# Patient Record
Sex: MALE | Race: WHITE | NOT HISPANIC OR LATINO | Employment: OTHER | URBAN - METROPOLITAN AREA
[De-identification: names, ages, dates, MRNs, and addresses within clinical notes are randomized per-mention and may not be internally consistent; named-entity substitution may affect disease eponyms.]

---

## 2017-11-29 ENCOUNTER — OFFICE VISIT (OUTPATIENT)
Dept: ORTHOPEDIC SURGERY | Facility: CLINIC | Age: 67
End: 2017-11-29

## 2017-11-29 VITALS
BODY MASS INDEX: 30.16 KG/M2 | SYSTOLIC BLOOD PRESSURE: 165 MMHG | HEIGHT: 74 IN | HEART RATE: 81 BPM | DIASTOLIC BLOOD PRESSURE: 96 MMHG | WEIGHT: 235 LBS

## 2017-11-29 DIAGNOSIS — S83.411A SPRAIN OF MEDIAL COLLATERAL LIGAMENT OF RIGHT KNEE, INITIAL ENCOUNTER: ICD-10-CM

## 2017-11-29 DIAGNOSIS — M17.11 PRIMARY OSTEOARTHRITIS OF RIGHT KNEE: Primary | ICD-10-CM

## 2017-11-29 PROCEDURE — 99203 OFFICE O/P NEW LOW 30 MIN: CPT | Performed by: ORTHOPAEDIC SURGERY

## 2017-11-29 NOTE — PROGRESS NOTES
[facility] Orthopaedic Surgery Clinic Note    Subjective     Chief Complaint   Patient presents with   • Right Knee - Pain        HPI    Darryl Mendoza is a 67 y.o. male. He presents today for evaluation of his right knee.  Pain is mild to moderate in the knee, occurring over the past 4 weeks, following an injury when he fell at home and twisted his knee.  The pain has slightly improved, and he is fully ambulatory without external aids.  The pain is aching and burning, associated with popping and stiffness.  It worsens with walking and standing.      Patient Active Problem List   Diagnosis   • Sprain of medial collateral ligament of right knee   • Primary osteoarthritis of right knee     Past Medical History:   Diagnosis Date   • Dupuytren's contracture    • Hypertension    • Pain, ankle    • Smoker       Past Surgical History:   Procedure Laterality Date   • CERVICAL DISC SURGERY     • CERVICAL FUSION     • TONSILLECTOMY        Family History   Problem Relation Age of Onset   • Stroke Mother    • Hypertension Mother    • Osteoarthritis Mother    • Cancer Father    • Stroke Other    • Heart disease Other    • Hypertension Other    • Heart attack Other    • Osteoarthritis Other      Social History     Social History   • Marital status: Unknown     Spouse name: N/A   • Number of children: N/A   • Years of education: N/A     Occupational History   • Not on file.     Social History Main Topics   • Smoking status: Current Every Day Smoker   • Smokeless tobacco: Never Used   • Alcohol use Yes      Comment: occasional   • Drug use: No   • Sexual activity: Defer     Other Topics Concern   • Not on file     Social History Narrative      Current Outpatient Prescriptions on File Prior to Visit   Medication Sig Dispense Refill   • acetaminophen (TYLENOL) 500 MG tablet Take 500 mg by mouth Take As Directed.     • Amlodipine-Valsartan-HCTZ (EXFORGE HCT) 5-160-12.5 MG tablet Take  by mouth Take As Directed.     • atorvastatin  "(LIPITOR) 20 MG tablet Take  by mouth Take As Directed.     • gabapentin (NEURONTIN) 400 MG capsule Take  by mouth Take As Directed.     • HYDROcodone-acetaminophen (NORCO) 5-325 MG per tablet Take  by mouth Take As Directed.     • raNITIdine (ZANTAC) 300 MG tablet Take  by mouth Take As Directed.       No current facility-administered medications on file prior to visit.       No Known Allergies     Review of Systems   Constitutional: Negative.    HENT: Negative.    Eyes: Negative.    Respiratory: Negative.    Cardiovascular: Negative.    Gastrointestinal: Negative.    Endocrine: Negative.    Genitourinary: Negative.    Musculoskeletal: Positive for arthralgias and neck stiffness.   Skin: Negative.    Allergic/Immunologic: Negative.    Neurological: Negative.    Hematological: Negative.    Psychiatric/Behavioral: Negative.         Objective      Physical Exam  /96  Pulse 81  Ht 73.62\" (187 cm)  Wt 235 lb (107 kg)  BMI 30.48 kg/m2    Body mass index is 30.48 kg/(m^2).    General:   Mental Status:  Alert   Appearance: Cooperative, in no acute distress   Build and Nutrition: Well-nourished and well developed male   Orientation: Alert and oriented to person, place and time   Posture: Normal   Gait: Normal    Integument:   Right knee: No skin lesions, no rash, no ecchymosis    Neurologic:   Sensation:    Right foot: Intact to light touch on the dorsal and plantar aspect   Motor:  Right lower extremity: 5/5 quadriceps, hamstrings, ankle dorsiflexors, and ankle plantar flexors    Vascular:   Right lower extremity: 2+ dorsalis pedis pulse, prompt capillary refill    Lower Extremities:   Right Knee:    Tenderness:  Mild tenderness over the medial collateral ligament and medial joint line    Effusion:  None    Swelling:  None    Crepitus:  Positive    Atrophy:  None    Range of motion:  Extension: 0°       Flexion: 130°  Instability:  No varus laxity, no valgus laxity, negative anterior drawer, straight leg raise is " intact  Deformities:  Palpable Baker cyst      Imaging/Studies  Imaging Results (last 24 hours)     Procedure Component Value Units Date/Time    XR Knee 4+ View Right [[id number]] Resulted:  11/29/17 1000     Updated:  11/29/17 1000    Narrative:       RIght Knee Radiographs  Indication: right knee pain  Views: Standing AP's and skiers of both knees, with lateral and sunrise   views of the right knee    Comparison: no prior studies available    Findings:   Arthritic changes are seen in the knee, with medial joint space narrowing,   patellofemoral arthritic changes, with patella joy.            Assessment and Plan     Darryl was seen today for pain.    Diagnoses and all orders for this visit:    Primary osteoarthritis of right knee  -     XR Knee 4+ View Right    Sprain of medial collateral ligament of right knee, initial encounter        I reviewed my findings with patient today.  I suspect that he sprained his medial collateral ligament, and he does have some underlying arthritis in the knee.  He was mainly concerned if he has some structural damage to the knee.  The pain he has at current time is mild, and is certainly tolerable.  At this point, we will continue with conservative treatment, and I will see him back if there is any worsening or problems in the future.  He is pleased to know that he has no major structural problem with the knee.      Return if symptoms worsen or fail to improve.      Medical Decision Making    Data/Risk: radiology tests and independent visualization of imaging, lab tests, or EMG/NCV      Prashanth Avelar MD  11/29/17  7:31 PM

## 2018-04-11 ENCOUNTER — OFFICE VISIT (OUTPATIENT)
Dept: ORTHOPEDIC SURGERY | Facility: CLINIC | Age: 68
End: 2018-04-11

## 2018-04-11 VITALS
SYSTOLIC BLOOD PRESSURE: 156 MMHG | HEART RATE: 86 BPM | HEIGHT: 74 IN | WEIGHT: 236.99 LBS | DIASTOLIC BLOOD PRESSURE: 90 MMHG | BODY MASS INDEX: 30.42 KG/M2

## 2018-04-11 DIAGNOSIS — M17.11 PRIMARY OSTEOARTHRITIS OF RIGHT KNEE: Primary | ICD-10-CM

## 2018-04-11 PROCEDURE — 99213 OFFICE O/P EST LOW 20 MIN: CPT | Performed by: ORTHOPAEDIC SURGERY

## 2018-04-11 PROCEDURE — 20610 DRAIN/INJ JOINT/BURSA W/O US: CPT | Performed by: ORTHOPAEDIC SURGERY

## 2018-04-11 RX ORDER — TRIAMCINOLONE ACETONIDE 40 MG/ML
40 INJECTION, SUSPENSION INTRA-ARTICULAR; INTRAMUSCULAR
Status: COMPLETED | OUTPATIENT
Start: 2018-04-11 | End: 2018-04-11

## 2018-04-11 RX ORDER — HYDROCODONE BITARTRATE AND ACETAMINOPHEN 5; 325 MG/1; MG/1
1 TABLET ORAL
COMMUNITY
Start: 2018-04-09 | End: 2018-07-08

## 2018-04-11 RX ADMIN — TRIAMCINOLONE ACETONIDE 40 MG: 40 INJECTION, SUSPENSION INTRA-ARTICULAR; INTRAMUSCULAR at 09:27

## 2018-04-11 NOTE — PROGRESS NOTES
Procedure   Large Joint Arthrocentesis  Date/Time: 4/11/2018 9:27 AM  Consent given by: patient  Site marked: site marked  Timeout: Immediately prior to procedure a time out was called to verify the correct patient, procedure, equipment, support staff and site/side marked as required   Supporting Documentation  Indications: pain   Procedure Details  Location: knee - R knee  Preparation: Patient was prepped and draped in the usual sterile fashion  Needle size: 22 G  Approach: anterolateral  Medications administered: 40 mg triamcinolone acetonide 40 MG/ML (Naropin NDC: [id number] LOT: [id number] EXP: [id number])  Aspirate amount: 23 mL  Aspirate: bloody  Patient tolerance: patient tolerated the procedure well with no immediate complications

## 2018-04-11 NOTE — PROGRESS NOTES
[facility] [place] Orthopaedic Surgery Clinic Note    Subjective     Chief Complaint   Patient presents with   • Right Knee - Follow-up     4-5 month follow up        HPI    Darryl Mendoza is a 67 y.o. male. He presents today for admission right knee pain.  Pain is been present for about 6 months, following an injury when he slipped his bathroom at home.  The pain is moderate to severe, aching and stabbing, associated with stiffness.  It worsens with walking, standing and climbing stairs.  It is associated with swelling also.  He also notices a lump in the posterior medial aspect of the knee.      Patient Active Problem List   Diagnosis   • Sprain of medial collateral ligament of right knee   • Primary osteoarthritis of right knee     Past Medical History:   Diagnosis Date   • Dupuytren's contracture    • Hypertension    • Pain, ankle    • Smoker       Past Surgical History:   Procedure Laterality Date   • CERVICAL DISC SURGERY     • CERVICAL FUSION     • TONSILLECTOMY        Family History   Problem Relation Age of Onset   • Stroke Mother    • Hypertension Mother    • Osteoarthritis Mother    • Cancer Father    • Stroke Other    • Heart disease Other    • Hypertension Other    • Heart attack Other    • Osteoarthritis Other      Social History     Social History   • Marital status: Unknown     Spouse name: N/A   • Number of children: N/A   • Years of education: N/A     Occupational History   • Not on file.     Social History Main Topics   • Smoking status: Current Every Day Smoker   • Smokeless tobacco: Never Used   • Alcohol use Yes      Comment: occasional   • Drug use: No   • Sexual activity: Defer     Other Topics Concern   • Not on file     Social History Narrative   • No narrative on file      Current Outpatient Prescriptions on File Prior to Visit   Medication Sig Dispense Refill   • acetaminophen (TYLENOL) 500 MG tablet Take 500 mg by mouth Take As Directed.     • Amlodipine-Valsartan-HCTZ (EXFORGE HCT) 5-160-12.5 MG  tablet Take  by mouth Take As Directed.     • atorvastatin (LIPITOR) 20 MG tablet Take  by mouth Take As Directed.     • gabapentin (NEURONTIN) 400 MG capsule Take  by mouth Take As Directed.     • raNITIdine (ZANTAC) 300 MG tablet Take  by mouth Take As Directed.     • [DISCONTINUED] HYDROcodone-acetaminophen (NORCO) 5-325 MG per tablet Take  by mouth Take As Directed.       No current facility-administered medications on file prior to visit.       No Known Allergies     Review of Systems   Constitutional: Negative for activity change, appetite change, chills, diaphoresis, fatigue, fever and unexpected weight change.   HENT: Negative for congestion, dental problem, drooling, ear discharge, ear pain, facial swelling, hearing loss, mouth sores, nosebleeds, postnasal drip, rhinorrhea, sinus pressure, sneezing, sore throat, tinnitus, trouble swallowing and voice change.    Eyes: Negative for photophobia, pain, discharge, redness, itching and visual disturbance.   Respiratory: Negative for apnea, cough, choking, chest tightness, shortness of breath, wheezing and stridor.    Cardiovascular: Negative for chest pain, palpitations and leg swelling.   Gastrointestinal: Negative for abdominal distention, abdominal pain, anal bleeding, blood in stool, constipation, diarrhea, nausea, rectal pain and vomiting.   Endocrine: Negative for cold intolerance, heat intolerance, polydipsia, polyphagia and polyuria.   Genitourinary: Negative for decreased urine volume, difficulty urinating, dysuria, enuresis, flank pain, frequency, genital sores, hematuria and urgency.   Musculoskeletal: Positive for arthralgias. Negative for back pain, gait problem, joint swelling, myalgias, neck pain and neck stiffness.   Skin: Negative for color change, pallor, rash and wound.   Allergic/Immunologic: Negative for environmental allergies, food allergies and immunocompromised state.   Neurological: Negative for dizziness, tremors, seizures, syncope,  "facial asymmetry, speech difficulty, weakness, light-headedness, numbness and headaches.   Hematological: Negative for adenopathy. Does not bruise/bleed easily.   Psychiatric/Behavioral: Negative for agitation, behavioral problems, confusion, decreased concentration, dysphoric mood, hallucinations, self-injury, sleep disturbance and suicidal ideas. The patient is not nervous/anxious and is not hyperactive.         Objective      Physical Exam  /90   Pulse 86   Ht 187 cm (73.62\")   Wt 107 kg (236 lb 15.9 oz)   BMI 30.74 kg/m²     Body mass index is 30.74 kg/m².    General:   Mental Status:  Alert   Appearance: Cooperative, in no acute distress   Build and Nutrition: Well-nourished and well developed male   Orientation: Alert and oriented to person, place and time   Posture: Normal   Gait: Mildly antalgic on the right    Integument:   Right knee: No skin lesions, no rash, no ecchymosis    Lower Extremities:   Right Knee:    Tenderness:  Medial joint line tenderness    Effusion:  2+    Swelling: None    Crepitus:  Positive    Range of motion:  Extension: 0°       Flexion: 120°  Instability:  No varus laxity, no valgus laxity, negative anterior drawer  Deformities:  Posterior medial fullness, consistent with probable Baker cyst        Assessment and Plan     Darryl was seen today for follow-up.    Diagnoses and all orders for this visit:    Primary osteoarthritis of right knee  -     MRI Knee Right Without Contrast; Future  -     Large Joint Arthrocentesis        I reviewed my findings with patient today.  At this point, recommended an aspiration and injection for his knee, and we will consider the same for probable Ledesma cyst, but I would like to get an MRI of his knee prior to confirm the Baker cyst.  I will see him back after the MRI, but sooner for any problems.    Of note, I obtained 20 cc of bloody joint fluid, and he had about 50% relief of his symptoms just a few minutes following the aspiration and " injection.      Return for After Imaging Study.      Medical Decision Making  Management Options : prescription/IM medicine        Prashanth Avelar MD  04/11/18  9:48 AM

## 2018-04-17 ENCOUNTER — HOSPITAL ENCOUNTER (OUTPATIENT)
Dept: MRI IMAGING | Facility: HOSPITAL | Age: 68
Discharge: HOME | End: 2018-04-17
Attending: ORTHOPAEDIC SURGERY | Admitting: ORTHOPAEDIC SURGERY

## 2018-04-17 DIAGNOSIS — M17.11 PRIMARY OSTEOARTHRITIS OF RIGHT KNEE: ICD-10-CM

## 2018-04-17 PROCEDURE — 73721 MRI JNT OF LWR EXTRE W/O DYE: CPT

## 2018-04-30 ENCOUNTER — OFFICE VISIT (OUTPATIENT)
Dept: ORTHOPEDIC SURGERY | Facility: CLINIC | Age: 68
End: 2018-04-30

## 2018-04-30 VITALS
BODY MASS INDEX: 30.27 KG/M2 | WEIGHT: 235.89 LBS | HEIGHT: 74 IN | DIASTOLIC BLOOD PRESSURE: 81 MMHG | HEART RATE: 87 BPM | SYSTOLIC BLOOD PRESSURE: 145 MMHG

## 2018-04-30 DIAGNOSIS — M17.11 PRIMARY OSTEOARTHRITIS OF RIGHT KNEE: Primary | ICD-10-CM

## 2018-04-30 PROCEDURE — 99214 OFFICE O/P EST MOD 30 MIN: CPT | Performed by: ORTHOPAEDIC SURGERY

## 2018-04-30 PROCEDURE — 20610 DRAIN/INJ JOINT/BURSA W/O US: CPT | Performed by: ORTHOPAEDIC SURGERY

## 2018-04-30 RX ORDER — TRIAMCINOLONE ACETONIDE 40 MG/ML
40 INJECTION, SUSPENSION INTRA-ARTICULAR; INTRAMUSCULAR
Status: COMPLETED | OUTPATIENT
Start: 2018-04-30 | End: 2018-04-30

## 2018-04-30 RX ORDER — ROPIVACAINE HYDROCHLORIDE 5 MG/ML
4 INJECTION, SOLUTION EPIDURAL; INFILTRATION; PERINEURAL
Status: COMPLETED | OUTPATIENT
Start: 2018-04-30 | End: 2018-04-30

## 2018-04-30 RX ADMIN — ROPIVACAINE HYDROCHLORIDE 4 ML: 5 INJECTION, SOLUTION EPIDURAL; INFILTRATION; PERINEURAL at 14:35

## 2018-04-30 RX ADMIN — TRIAMCINOLONE ACETONIDE 40 MG: 40 INJECTION, SUSPENSION INTRA-ARTICULAR; INTRAMUSCULAR at 14:35

## 2018-04-30 NOTE — PROGRESS NOTES
Procedure   Large Joint Arthrocentesis  Date/Time: 4/30/2018 2:35 PM  Consent given by: patient  Site marked: site marked  Timeout: Immediately prior to procedure a time out was called to verify the correct patient, procedure, equipment, support staff and site/side marked as required   Supporting Documentation  Indications: pain   Procedure Details  Location: knee -   Preparation: Patient was prepped and draped in the usual sterile fashion  Needle size: 22 G  Approach: anterolateral  Medications administered: 4 mL ropivacaine 0.5 %; 40 mg triamcinolone acetonide 40 MG/ML  Aspirate amount: 43 mL  Aspirate: yellow and serous  Patient tolerance: patient tolerated the procedure well with no immediate complications

## 2018-04-30 NOTE — PROGRESS NOTES
[facility] [place] Orthopaedic Surgery Clinic Note    Subjective     Chief Complaint   Patient presents with   • Right Knee - Follow-up     After MRI 04/17/2018        HPI    Darryl Mendoza is a 67 y.o. male. He follows up today for his right knee.  He has MRI performed, and is here today to discuss results.  He did respond to the aspiration and injection on his last visit.  The pain is mild-to-moderate, aching and burning, associated with swelling and stiffness.  It worsens with climbing stairs.  Overall he is improved from his last visit.      Patient Active Problem List   Diagnosis   • Sprain of medial collateral ligament of right knee   • Primary osteoarthritis of right knee     Past Medical History:   Diagnosis Date   • Dupuytren's contracture    • Hypertension    • Pain, ankle    • Smoker       Past Surgical History:   Procedure Laterality Date   • CERVICAL DISC SURGERY     • CERVICAL FUSION     • TONSILLECTOMY        Family History   Problem Relation Age of Onset   • Stroke Mother    • Hypertension Mother    • Osteoarthritis Mother    • Cancer Father    • Stroke Other    • Heart disease Other    • Hypertension Other    • Heart attack Other    • Osteoarthritis Other      Social History     Social History   • Marital status: Unknown     Spouse name: N/A   • Number of children: N/A   • Years of education: N/A     Occupational History   • Not on file.     Social History Main Topics   • Smoking status: Current Every Day Smoker   • Smokeless tobacco: Never Used   • Alcohol use Yes      Comment: occasional   • Drug use: No   • Sexual activity: Defer     Other Topics Concern   • Not on file     Social History Narrative   • No narrative on file      Current Outpatient Prescriptions on File Prior to Visit   Medication Sig Dispense Refill   • acetaminophen (TYLENOL) 500 MG tablet Take 500 mg by mouth Take As Directed.     • Amlodipine-Valsartan-HCTZ (EXFORGE HCT) 5-160-12.5 MG tablet Take  by mouth Take As Directed.     •  atorvastatin (LIPITOR) 20 MG tablet Take  by mouth Take As Directed.     • gabapentin (NEURONTIN) 400 MG capsule Take  by mouth Take As Directed.     • HYDROcodone-acetaminophen (NORCO) 5-325 MG per tablet Take 1 tablet by mouth.     • raNITIdine (ZANTAC) 300 MG tablet Take  by mouth Take As Directed.       No current facility-administered medications on file prior to visit.       No Known Allergies     Review of Systems   Constitutional: Negative for activity change, appetite change, chills, diaphoresis, fatigue, fever and unexpected weight change.   HENT: Negative for congestion, dental problem, drooling, ear discharge, ear pain, facial swelling, hearing loss, mouth sores, nosebleeds, postnasal drip, rhinorrhea, sinus pressure, sneezing, sore throat, tinnitus, trouble swallowing and voice change.    Eyes: Negative for photophobia, pain, discharge, redness, itching and visual disturbance.   Respiratory: Negative for apnea, cough, choking, chest tightness, shortness of breath, wheezing and stridor.    Cardiovascular: Negative for chest pain, palpitations and leg swelling.   Gastrointestinal: Negative for abdominal distention, abdominal pain, anal bleeding, blood in stool, constipation, diarrhea, nausea, rectal pain and vomiting.   Endocrine: Negative for cold intolerance, heat intolerance, polydipsia, polyphagia and polyuria.   Genitourinary: Negative for decreased urine volume, difficulty urinating, dysuria, enuresis, flank pain, frequency, genital sores, hematuria and urgency.   Musculoskeletal: Positive for joint swelling. Negative for arthralgias, back pain, gait problem, myalgias, neck pain and neck stiffness.   Skin: Negative for color change, pallor, rash and wound.   Allergic/Immunologic: Negative for environmental allergies, food allergies and immunocompromised state.   Neurological: Negative for dizziness, tremors, seizures, syncope, facial asymmetry, speech difficulty, weakness, light-headedness, numbness  "and headaches.   Hematological: Negative for adenopathy. Does not bruise/bleed easily.   Psychiatric/Behavioral: Negative for agitation, behavioral problems, confusion, decreased concentration, dysphoric mood, hallucinations, self-injury, sleep disturbance and suicidal ideas. The patient is not nervous/anxious and is not hyperactive.         Objective      Physical Exam  /81   Pulse 87   Ht 187 cm (73.62\")   Wt 107 kg (235 lb 14.3 oz)   BMI 30.60 kg/m²     Body mass index is 30.6 kg/m².    General:   Mental Status:  Alert   Appearance: Cooperative, in no acute distress   Build and Nutrition: Well-nourished and well developed male   Orientation: Alert and oriented to person, place and time   Posture: Normal   Gait: Mildly antalgic on the right    Integument:   Right knee: No skin lesions, no rash, no ecchymosis    Lower Extremities:   Right Knee:    Tenderness:  Medial joint line tenderness    Effusion:  2+    Swelling: None    Crepitus:  None    Range of motion:  Extension: 0°       Flexion: 120°  Instability:  No varus laxity, no valgus laxity, negative anterior drawer  Deformities:  Popliteal cyst      Imaging/Studies    MRI right knee:  IMPRESSION:  1. Approximately 8 x 3 x 2 cm popliteal fossa cyst.  2. Torn medial meniscus.  3. Moderate joint effusion and mild generalized chondromalacia.      D:  04/17/2018  E:  04/18/2018     This report was finalized on 4/18/2018 10:11 PM by DR. Abimael Medina MD.    Assessment and Plan     Darryl was seen today for follow-up.    Diagnoses and all orders for this visit:    Primary osteoarthritis of right knee  -     Large Joint Arthrocentesis        I reviewed my findings with patient today.  MRI shows a tear in the medial meniscus, as well as large Baker cyst, and mild underlying arthritis.  I discussed his treatment options today, and he would like to have another aspiration and injection today, and consider arthroscopic intervention in the future.  Knee was " aspirated today, and I got 43 cc of clear straw-colored fluid, and then injected the knee, with 20% relief just a few minutes following the injection.  I will see him back in 4 weeks, but sooner for any problems.    Return in about 4 weeks (around 5/28/2018).      Medical Decision Making  Management Options : prescription/IM medicine  Data/Risk: independent visualization of imaging, lab tests, or EMG/NCV      Prashanth Avelar MD  04/30/18  2:59 PM

## 2018-06-04 ENCOUNTER — OFFICE VISIT (OUTPATIENT)
Dept: ORTHOPEDIC SURGERY | Facility: CLINIC | Age: 68
End: 2018-06-04

## 2018-06-04 VITALS — BODY MASS INDEX: 30.27 KG/M2 | HEART RATE: 81 BPM | OXYGEN SATURATION: 96 % | WEIGHT: 235.89 LBS | HEIGHT: 74 IN

## 2018-06-04 DIAGNOSIS — M23.203 OLD COMPLEX TEAR OF MEDIAL MENISCUS OF RIGHT KNEE: ICD-10-CM

## 2018-06-04 DIAGNOSIS — M17.11 PRIMARY OSTEOARTHRITIS OF RIGHT KNEE: Primary | ICD-10-CM

## 2018-06-04 PROCEDURE — 20610 DRAIN/INJ JOINT/BURSA W/O US: CPT | Performed by: ORTHOPAEDIC SURGERY

## 2018-06-04 RX ORDER — TRIAMCINOLONE ACETONIDE 40 MG/ML
40 INJECTION, SUSPENSION INTRA-ARTICULAR; INTRAMUSCULAR
Status: COMPLETED | OUTPATIENT
Start: 2018-06-04 | End: 2018-06-04

## 2018-06-04 RX ORDER — PENICILLIN V POTASSIUM 500 MG/1
TABLET ORAL
COMMUNITY
Start: 2018-05-15 | End: 2019-01-14

## 2018-06-04 RX ORDER — ROPIVACAINE HYDROCHLORIDE 5 MG/ML
4 INJECTION, SOLUTION EPIDURAL; INFILTRATION; PERINEURAL
Status: COMPLETED | OUTPATIENT
Start: 2018-06-04 | End: 2018-06-04

## 2018-06-04 RX ADMIN — ROPIVACAINE HYDROCHLORIDE 4 ML: 5 INJECTION, SOLUTION EPIDURAL; INFILTRATION; PERINEURAL at 16:46

## 2018-06-04 RX ADMIN — TRIAMCINOLONE ACETONIDE 40 MG: 40 INJECTION, SUSPENSION INTRA-ARTICULAR; INTRAMUSCULAR at 16:46

## 2018-06-04 NOTE — PROGRESS NOTES
Procedure   Large Joint Arthrocentesis  Date/Time: 6/4/2018 4:46 PM  Consent given by: patient  Site marked: site marked  Timeout: Immediately prior to procedure a time out was called to verify the correct patient, procedure, equipment, support staff and site/side marked as required   Supporting Documentation  Indications: pain   Procedure Details  Location: knee -   Preparation: Patient was prepped and draped in the usual sterile fashion  Needle size: 22 G  Approach: anterolateral  Medications administered: 4 mL ropivacaine 0.5 %; 40 mg triamcinolone acetonide 40 MG/ML  Aspirate amount: 20 mL  Aspirate: blood-tinged  Patient tolerance: patient tolerated the procedure well with no immediate complications

## 2018-06-04 NOTE — PROGRESS NOTES
[facility] – [place] [facility]    Subjective     Chief Complaint   Patient presents with   • Right Knee - Follow-up     Primary osteoarthritis of right knee; 4 week f/u        HPI    Darryl Mendoza is a 67 y.o. male. He follows up today for his right knee.  He has recurrent swelling and mild pain.  The pain is on the medial aspect of knee, associated with stiffness, and worsens with walking, standing and climbing stairs.  We discussed possible arthroscopic intervention.  He is not keen on that idea today, and would prefer to wait until September.      Patient Active Problem List   Diagnosis   • Sprain of medial collateral ligament of right knee   • Primary osteoarthritis of right knee     Past Medical History:   Diagnosis Date   • Dupuytren's contracture    • Hypertension    • Pain, ankle    • Smoker       Past Surgical History:   Procedure Laterality Date   • CERVICAL DISC SURGERY     • CERVICAL FUSION     • TONSILLECTOMY        Family History   Problem Relation Age of Onset   • Stroke Mother    • Hypertension Mother    • Osteoarthritis Mother    • Cancer Father    • Stroke Other    • Heart disease Other    • Hypertension Other    • Heart attack Other    • Osteoarthritis Other      Social History     Social History   • Marital status: Unknown     Spouse name: N/A   • Number of children: N/A   • Years of education: N/A     Occupational History   • Not on file.     Social History Main Topics   • Smoking status: Current Every Day Smoker   • Smokeless tobacco: Never Used   • Alcohol use Yes      Comment: occasional   • Drug use: No   • Sexual activity: Defer     Other Topics Concern   • Not on file     Social History Narrative   • No narrative on file      Current Outpatient Prescriptions on File Prior to Visit   Medication Sig Dispense Refill   • acetaminophen (TYLENOL) 500 MG tablet Take 500 mg by mouth Take As Directed.     • Amlodipine-Valsartan-HCTZ (EXFORGE HCT) 5-160-12.5 MG tablet Take  by mouth Take  As Directed.     • atorvastatin (LIPITOR) 20 MG tablet Take  by mouth Take As Directed.     • gabapentin (NEURONTIN) 400 MG capsule Take  by mouth Take As Directed.     • HYDROcodone-acetaminophen (NORCO) 5-325 MG per tablet Take 1 tablet by mouth.     • raNITIdine (ZANTAC) 300 MG tablet Take  by mouth Take As Directed.       No current facility-administered medications on file prior to visit.       No Known Allergies     Review of Systems   Constitutional: Negative for activity change, appetite change, chills, diaphoresis, fatigue, fever and unexpected weight change.   HENT: Negative for congestion, dental problem, drooling, ear discharge, ear pain, facial swelling, hearing loss, mouth sores, nosebleeds, postnasal drip, rhinorrhea, sinus pressure, sneezing, sore throat, tinnitus, trouble swallowing and voice change.    Eyes: Negative for photophobia, pain, discharge, redness, itching and visual disturbance.   Respiratory: Negative for apnea, cough, choking, chest tightness, shortness of breath, wheezing and stridor.    Cardiovascular: Negative for chest pain, palpitations and leg swelling.   Gastrointestinal: Negative for abdominal distention, abdominal pain, anal bleeding, blood in stool, constipation, diarrhea, nausea, rectal pain and vomiting.   Endocrine: Negative for cold intolerance, heat intolerance, polydipsia, polyphagia and polyuria.   Genitourinary: Negative for decreased urine volume, difficulty urinating, dysuria, enuresis, flank pain, frequency, genital sores, hematuria and urgency.   Musculoskeletal: Positive for joint swelling. Negative for arthralgias, back pain, gait problem, myalgias, neck pain and neck stiffness.        Joint Pain    Skin: Negative for color change, pallor, rash and wound.   Allergic/Immunologic: Negative for environmental allergies, food allergies and immunocompromised state.   Neurological: Negative for dizziness, tremors, seizures, syncope, facial asymmetry, speech  "difficulty, weakness, light-headedness, numbness and headaches.   Hematological: Negative for adenopathy. Does not bruise/bleed easily.   Psychiatric/Behavioral: Negative for agitation, behavioral problems, confusion, decreased concentration, dysphoric mood, hallucinations, self-injury, sleep disturbance and suicidal ideas. The patient is not nervous/anxious and is not hyperactive.         Objective      Physical Exam  Pulse 81   Ht 187 cm (73.62\")   Wt 107 kg (235 lb 14.3 oz)   SpO2 96%   BMI 30.60 kg/m²     Body mass index is 30.6 kg/m².    General:   Mental Status:  Alert   Appearance: Cooperative, in no acute distress   Build and Nutrition: Well-nourished and well developed male   Orientation: Alert and oriented to person, place and time   Posture: Normal   Gait: Normal    Integument:   Right knee: No skin lesions, no rash, no ecchymosis    Lower Extremities:   Right Knee:    Tenderness:  Medial joint line tenderness    Effusion:  2-3+    Swelling: None    Crepitus:  None    Range of motion:  Extension: 0°       Flexion: 130°  Instability:  No varus laxity, no valgus laxity, negative anterior drawer  Deformities:  None        Assessment and Plan     Darryl was seen today for follow-up.    Diagnoses and all orders for this visit:    Primary osteoarthritis of right knee  -     Large Joint Arthrocentesis    Old complex tear of medial meniscus of right knee        I reviewed my findings with patient today.  His right knee is bothering him, and he is not interested in arthroscopic intervention at this time.  He prefers to wait until September.  He would like to enjoy his summer, and would like to do an aspiration and injection on the knee today.  This was provided.  Please see my procedure note for details.  I will see him back in 2 months, but sooner for any problems.  We briefly discussed surgical intervention today, as well as the risks, benefits, and alternatives.    I did obtain 20 cc of clear straw-colored " fluid, and then injected his knee.  He had 40% improvement just a few minutes following the injection.    Return in about 2 months (around 8/4/2018).      Medical Decision Making  Management Options : prescription/IM medicine        Prashanth Avelar MD  06/04/18  5:00 PM

## 2018-06-11 ENCOUNTER — TELEPHONE (OUTPATIENT)
Dept: ORTHOPEDIC SURGERY | Facility: CLINIC | Age: 68
End: 2018-06-11

## 2018-06-19 DIAGNOSIS — M23.203 OLD TEAR OF MEDIAL MENISCUS OF RIGHT KNEE, UNSPECIFIED TEAR TYPE: Primary | ICD-10-CM

## 2018-07-27 ENCOUNTER — OUTSIDE FACILITY SERVICE (OUTPATIENT)
Dept: ORTHOPEDIC SURGERY | Facility: CLINIC | Age: 68
End: 2018-07-27

## 2018-07-27 ENCOUNTER — LAB REQUISITION (OUTPATIENT)
Dept: LAB | Facility: HOSPITAL | Age: 68
End: 2018-07-27

## 2018-07-27 DIAGNOSIS — M23.203 DERANGEMENT OF MEDIAL MENISCUS OF RIGHT KNEE DUE TO OLD INJURY: ICD-10-CM

## 2018-07-27 LAB — POTASSIUM BLDA-SCNC: 3.91 MMOL/L (ref 3.5–5.3)

## 2018-07-27 PROCEDURE — 84132 ASSAY OF SERUM POTASSIUM: CPT | Performed by: ORTHOPAEDIC SURGERY

## 2018-07-27 PROCEDURE — 29880 ARTHRS KNE SRG MNISECTMY M&L: CPT | Performed by: ORTHOPAEDIC SURGERY

## 2018-08-08 ENCOUNTER — OFFICE VISIT (OUTPATIENT)
Dept: ORTHOPEDIC SURGERY | Facility: CLINIC | Age: 68
End: 2018-08-08

## 2018-08-08 DIAGNOSIS — Z47.89 ORTHOPEDIC AFTERCARE: Primary | ICD-10-CM

## 2018-08-08 PROCEDURE — 99024 POSTOP FOLLOW-UP VISIT: CPT | Performed by: ORTHOPAEDIC SURGERY

## 2018-08-08 RX ORDER — HYDROCODONE BITARTRATE AND ACETAMINOPHEN 5; 325 MG/1; MG/1
TABLET ORAL
COMMUNITY
Start: 2018-07-11 | End: 2022-03-02

## 2018-08-08 NOTE — PROGRESS NOTES
[facility] – [place] [facility] Note    Subjective     Chief Complaint   Patient presents with   • Post-op     12 days s/p RIGHT KNEE ARTHRSCOPY W/ PARTIAL MEDIAL AND LATERAL MENISCECTOMIES - 07/27/18        HPI    Darryl Mendoza is a 67 y.o. male.  He follows up today for his right knee.  No new complaints today.  50% improvement compared to his preoperative symptoms.  He is ambulatory without external aids.  Doing well following his right knee arthroscopy with partial medial and lateral meniscectomies.      Patient Active Problem List   Diagnosis   • Sprain of medial collateral ligament of right knee   • Primary osteoarthritis of right knee     Past Medical History:   Diagnosis Date   • Dupuytren's contracture    • Hypertension    • Pain, ankle    • Smoker       Past Surgical History:   Procedure Laterality Date   • CERVICAL DISC SURGERY     • CERVICAL FUSION     • TONSILLECTOMY        Family History   Problem Relation Age of Onset   • Stroke Mother    • Hypertension Mother    • Osteoarthritis Mother    • Cancer Father    • Stroke Other    • Heart disease Other    • Hypertension Other    • Heart attack Other    • Osteoarthritis Other      Social History     Social History   • Marital status: Unknown     Spouse name: N/A   • Number of children: N/A   • Years of education: N/A     Occupational History   • Not on file.     Social History Main Topics   • Smoking status: Current Every Day Smoker   • Smokeless tobacco: Never Used   • Alcohol use Yes      Comment: occasional   • Drug use: No   • Sexual activity: Defer     Other Topics Concern   • Not on file     Social History Narrative   • No narrative on file      Current Outpatient Prescriptions on File Prior to Visit   Medication Sig Dispense Refill   • acetaminophen (TYLENOL) 500 MG tablet Take 500 mg by mouth Take As Directed.     • Amlodipine-Valsartan-HCTZ (EXFORGE HCT) 5-160-12.5 MG tablet Take  by mouth Take As Directed.     • atorvastatin (LIPITOR) 20 MG  tablet Take  by mouth Take As Directed.     • gabapentin (NEURONTIN) 400 MG capsule Take  by mouth Take As Directed.     • penicillin v potassium (VEETID) 500 MG tablet      • raNITIdine (ZANTAC) 300 MG tablet Take  by mouth Take As Directed.       No current facility-administered medications on file prior to visit.       No Known Allergies     Review of Systems   Constitutional: Negative.  Negative for chills and fever.   HENT: Negative.    Eyes: Negative.    Respiratory: Negative.    Cardiovascular: Negative.    Gastrointestinal: Negative.    Endocrine: Negative.    Genitourinary: Negative.    Musculoskeletal: Positive for arthralgias (Right knee) and joint swelling.   Skin: Negative.    Allergic/Immunologic: Negative.    Neurological: Negative.    Hematological: Negative.    Psychiatric/Behavioral: Negative.         Objective      Physical Exam  There were no vitals taken for this visit.    There is no height or weight on file to calculate BMI.    General:   Mental Status:  Alert   Appearance: Cooperative, in no acute distress   Build and Nutrition: Well-nourished and well developed male   Orientation: Alert and oriented to person, place and time   Posture: Normal   Gait: Normal    Integument:   Right knee: Portal sites are well-healed    Lower Extremities:   Right Knee:    Tenderness:  None    Effusion:  None    Swelling: None    Crepitus:  None    Range of motion:  Extension: 0°       Flexion: 140°  Instability:  No varus laxity, no valgus laxity, negative anterior drawer  Deformities:  Popliteal cyst      Assessment and Plan     Darryl was seen today for post-op.    Diagnoses and all orders for this visit:    Orthopedic aftercare        I reviewed my findings with patient today.  He is recovering well from his right knee arthroscopy.  We reviewed his arthroscopic pictures together today.  I will see him back in 4-6 weeks for what may be a final checkup, but sooner for any problems.    Return in about 4 weeks  (around 9/5/2018).      Prashanth Avelar MD  08/08/18  10:42 AM

## 2018-09-10 ENCOUNTER — OFFICE VISIT (OUTPATIENT)
Dept: ORTHOPEDIC SURGERY | Facility: CLINIC | Age: 68
End: 2018-09-10

## 2018-09-10 DIAGNOSIS — Z47.89 ORTHOPEDIC AFTERCARE: Primary | ICD-10-CM

## 2018-09-10 PROCEDURE — 99024 POSTOP FOLLOW-UP VISIT: CPT | Performed by: ORTHOPAEDIC SURGERY

## 2018-09-10 NOTE — PROGRESS NOTES
[facility] – [place] [facility]    Subjective     Chief Complaint   Patient presents with   • Post-op     4 week f/u; 6 weeks status post RIGHT KNEE ARTHRSCOPY W/ PARTIAL MEDIAL AND LATERAL MENISCECTOMIES - 07/27/18        HPI    Darryl Mendoza is a 68 y.o. male.  He follows up today for his right knee.  No new complaints today.  Some soreness, but the pain is 2 out of 10, dull in nature, and overall improved compared to his preoperative symptoms.      Patient Active Problem List   Diagnosis   • Sprain of medial collateral ligament of right knee   • Primary osteoarthritis of right knee     Past Medical History:   Diagnosis Date   • Dupuytren's contracture    • Hypertension    • Pain, ankle    • Smoker       Past Surgical History:   Procedure Laterality Date   • CERVICAL DISC SURGERY     • CERVICAL FUSION     • TONSILLECTOMY        Family History   Problem Relation Age of Onset   • Stroke Mother    • Hypertension Mother    • Osteoarthritis Mother    • Cancer Father    • Stroke Other    • Heart disease Other    • Hypertension Other    • Heart attack Other    • Osteoarthritis Other      Social History     Social History   • Marital status: Unknown     Spouse name: N/A   • Number of children: N/A   • Years of education: N/A     Occupational History   • Not on file.     Social History Main Topics   • Smoking status: Current Every Day Smoker   • Smokeless tobacco: Never Used   • Alcohol use Yes      Comment: occasional   • Drug use: No   • Sexual activity: Defer     Other Topics Concern   • Not on file     Social History Narrative   • No narrative on file      Current Outpatient Prescriptions on File Prior to Visit   Medication Sig Dispense Refill   • acetaminophen (TYLENOL) 500 MG tablet Take 500 mg by mouth Take As Directed.     • Amlodipine-Valsartan-HCTZ (EXFORGE HCT) 5-160-12.5 MG tablet Take  by mouth Take As Directed.     • atorvastatin (LIPITOR) 20 MG tablet Take  by mouth Take As Directed.     •  gabapentin (NEURONTIN) 400 MG capsule Take  by mouth Take As Directed.     • HYDROcodone-acetaminophen (NORCO) 5-325 MG per tablet      • penicillin v potassium (VEETID) 500 MG tablet      • raNITIdine (ZANTAC) 300 MG tablet Take  by mouth Take As Directed.       No current facility-administered medications on file prior to visit.       No Known Allergies     Review of Systems   Constitutional: Negative for activity change, appetite change, chills, diaphoresis, fatigue, fever and unexpected weight change.   HENT: Negative for congestion, dental problem, drooling, ear discharge, ear pain, facial swelling, hearing loss, mouth sores, nosebleeds, postnasal drip, rhinorrhea, sinus pressure, sneezing, sore throat, tinnitus, trouble swallowing and voice change.    Eyes: Negative for photophobia, pain, discharge, redness, itching and visual disturbance.   Respiratory: Negative for apnea, cough, choking, chest tightness, shortness of breath, wheezing and stridor.    Cardiovascular: Negative for chest pain, palpitations and leg swelling.   Gastrointestinal: Negative for abdominal distention, abdominal pain, anal bleeding, blood in stool, constipation, diarrhea, nausea, rectal pain and vomiting.   Endocrine: Negative for cold intolerance, heat intolerance, polydipsia, polyphagia and polyuria.   Genitourinary: Negative for decreased urine volume, difficulty urinating, dysuria, enuresis, flank pain, frequency, genital sores, hematuria and urgency.   Musculoskeletal: Positive for joint swelling. Negative for arthralgias, back pain, gait problem, myalgias, neck pain and neck stiffness.   Skin: Negative for color change, pallor, rash and wound.   Allergic/Immunologic: Negative for environmental allergies, food allergies and immunocompromised state.   Neurological: Negative for dizziness, tremors, seizures, syncope, facial asymmetry, speech difficulty, weakness, light-headedness, numbness and headaches.   Hematological: Negative for  adenopathy. Does not bruise/bleed easily.   Psychiatric/Behavioral: Negative for agitation, behavioral problems, confusion, decreased concentration, dysphoric mood, hallucinations, self-injury, sleep disturbance and suicidal ideas. The patient is not nervous/anxious and is not hyperactive.         Objective      Physical Exam  There were no vitals taken for this visit.    There is no height or weight on file to calculate BMI.    General:   Mental Status:  Alert   Appearance: Cooperative, in no acute distress   Build and Nutrition: Well-nourished and well developed male   Orientation: Alert and oriented to person, place and time   Posture: Normal   Gait: Normal    Integument:   Right knee: No skin lesions, no rash, no ecchymosis    Lower Extremities:   Right Knee:    Tenderness:  None    Effusion:  None    Swelling: None    Crepitus:  Positive    Range of motion:  Extension: 0°       Flexion: 140°  Instability:  No varus laxity, no valgus laxity, negative anterior drawer  Deformities:  Baker cyst palpable            Assessment and Plan     Darryl was seen today for post-op.    Diagnoses and all orders for this visit:    Orthopedic aftercare        I reviewed my findings with patient today.  His right knee is doing reasonably well, and I will see him back in 2 months.  The Ledesma cyst does bother him off and on, and we may consider an aspiration in the future if appropriate.    Return in about 2 months (around 11/10/2018).        Prashanth Avelar MD  09/10/18  2:50 PM

## 2018-11-14 ENCOUNTER — OFFICE VISIT (OUTPATIENT)
Dept: ORTHOPEDIC SURGERY | Facility: CLINIC | Age: 68
End: 2018-11-14

## 2018-11-14 VITALS — BODY MASS INDEX: 29.77 KG/M2 | OXYGEN SATURATION: 97 % | HEIGHT: 74 IN | WEIGHT: 232 LBS | HEART RATE: 70 BPM

## 2018-11-14 DIAGNOSIS — M17.11 PRIMARY OSTEOARTHRITIS OF RIGHT KNEE: Primary | ICD-10-CM

## 2018-11-14 PROCEDURE — 20610 DRAIN/INJ JOINT/BURSA W/O US: CPT | Performed by: ORTHOPAEDIC SURGERY

## 2018-11-14 PROCEDURE — 99213 OFFICE O/P EST LOW 20 MIN: CPT | Performed by: ORTHOPAEDIC SURGERY

## 2018-11-14 RX ORDER — ROPIVACAINE HYDROCHLORIDE 5 MG/ML
4 INJECTION, SOLUTION EPIDURAL; INFILTRATION; PERINEURAL
Status: COMPLETED | OUTPATIENT
Start: 2018-11-14 | End: 2018-11-14

## 2018-11-14 RX ORDER — TRIAMCINOLONE ACETONIDE 40 MG/ML
40 INJECTION, SUSPENSION INTRA-ARTICULAR; INTRAMUSCULAR
Status: COMPLETED | OUTPATIENT
Start: 2018-11-14 | End: 2018-11-14

## 2018-11-14 RX ADMIN — TRIAMCINOLONE ACETONIDE 40 MG: 40 INJECTION, SUSPENSION INTRA-ARTICULAR; INTRAMUSCULAR at 15:26

## 2018-11-14 RX ADMIN — ROPIVACAINE HYDROCHLORIDE 4 ML: 5 INJECTION, SOLUTION EPIDURAL; INFILTRATION; PERINEURAL at 15:26

## 2018-11-14 NOTE — PROGRESS NOTES
Procedure   Large Joint Arthrocentesis: R knee  Date/Time: 11/14/2018 3:26 PM  Consent given by: patient  Site marked: site marked  Timeout: Immediately prior to procedure a time out was called to verify the correct patient, procedure, equipment, support staff and site/side marked as required   Supporting Documentation  Indications: pain   Procedure Details  Location: knee - R knee  Preparation: Patient was prepped and draped in the usual sterile fashion  Needle size: 22 G  Approach: anterolateral  Medications administered: 4 mL ropivacaine 0.5 %; 40 mg triamcinolone acetonide 40 MG/ML  Patient tolerance: patient tolerated the procedure well with no immediate complications

## 2018-11-14 NOTE — PROGRESS NOTES
[facility] [place] [facility] [place]    Subjective     Chief Complaint   Patient presents with   • Follow-up     2 months f/u- 3.5 mos status post RIGHT KNEE ARTHRSCOPY W/ PARTIAL MEDIAL AND LATERAL MENISCECTOMIES - 07/27/18        HPI    Darryl Mendoza is a 68 y.o. male.  He follows up today for his right knee.  He did have an injury on 10/19/2018, when he misstepped on the hill side walking to the leg.  He had the onset of pain medially, which is 3 out of 10, dull and achy, along the medial aspect of his knee.  It worsens with walking and climbing stairs.  It is associated with swelling.      Patient Active Problem List   Diagnosis   • Sprain of medial collateral ligament of right knee   • Primary osteoarthritis of right knee     Past Medical History:   Diagnosis Date   • Dupuytren's contracture    • Hypertension    • Pain, ankle    • Smoker       Past Surgical History:   Procedure Laterality Date   • CERVICAL DISC SURGERY     • CERVICAL FUSION     • TONSILLECTOMY        Family History   Problem Relation Age of Onset   • Stroke Mother    • Hypertension Mother    • Osteoarthritis Mother    • Cancer Father    • Stroke Other    • Heart disease Other    • Hypertension Other    • Heart attack Other    • Osteoarthritis Other      Social History     Socioeconomic History   • Marital status: Unknown     Spouse name: Not on file   • Number of children: Not on file   • Years of education: Not on file   • Highest education level: Not on file   Social Needs   • Financial resource strain: Not on file   • Food insecurity - worry: Not on file   • Food insecurity - inability: Not on file   • Transportation needs - medical: Not on file   • Transportation needs - non-medical: Not on file   Occupational History   • Not on file   Tobacco Use   • Smoking status: Current Every Day Smoker   • Smokeless tobacco: Never Used   Substance and Sexual Activity   • Alcohol use: Yes     Comment: occasional   • Drug use: No   • Sexual  activity: Defer   Other Topics Concern   • Not on file   Social History Narrative   • Not on file      Current Outpatient Medications on File Prior to Visit   Medication Sig Dispense Refill   • acetaminophen (TYLENOL) 500 MG tablet Take 500 mg by mouth Take As Directed.     • Amlodipine-Valsartan-HCTZ (EXFORGE HCT) 5-160-12.5 MG tablet Take  by mouth Take As Directed.     • atorvastatin (LIPITOR) 20 MG tablet Take  by mouth Take As Directed.     • gabapentin (NEURONTIN) 400 MG capsule Take  by mouth Take As Directed.     • HYDROcodone-acetaminophen (NORCO) 5-325 MG per tablet      • penicillin v potassium (VEETID) 500 MG tablet      • raNITIdine (ZANTAC) 300 MG tablet Take  by mouth Take As Directed.       No current facility-administered medications on file prior to visit.       No Known Allergies     Review of Systems   Constitutional: Negative for activity change, appetite change, chills, diaphoresis, fatigue, fever and unexpected weight change.   HENT: Negative for congestion, dental problem, drooling, ear discharge, ear pain, facial swelling, hearing loss, mouth sores, nosebleeds, postnasal drip, rhinorrhea, sinus pressure, sneezing, sore throat, tinnitus, trouble swallowing and voice change.    Eyes: Negative for photophobia, pain, discharge, redness, itching and visual disturbance.   Respiratory: Negative for apnea, cough, choking, chest tightness, shortness of breath, wheezing and stridor.    Cardiovascular: Negative for chest pain, palpitations and leg swelling.   Gastrointestinal: Negative for abdominal distention, abdominal pain, anal bleeding, blood in stool, constipation, diarrhea, nausea, rectal pain and vomiting.   Endocrine: Negative for cold intolerance, heat intolerance, polydipsia, polyphagia and polyuria.   Genitourinary: Negative for decreased urine volume, difficulty urinating, dysuria, enuresis, flank pain, frequency, genital sores, hematuria and urgency.   Musculoskeletal: Positive for  "arthralgias and joint swelling. Negative for back pain, gait problem, myalgias, neck pain and neck stiffness.   Skin: Negative for color change, pallor, rash and wound.   Allergic/Immunologic: Negative for environmental allergies, food allergies and immunocompromised state.   Neurological: Negative for dizziness, tremors, seizures, syncope, facial asymmetry, speech difficulty, weakness, light-headedness, numbness and headaches.   Hematological: Negative for adenopathy. Does not bruise/bleed easily.   Psychiatric/Behavioral: Negative for agitation, behavioral problems, confusion, decreased concentration, dysphoric mood, hallucinations, self-injury, sleep disturbance and suicidal ideas. The patient is not nervous/anxious and is not hyperactive.         Objective      Physical Exam  Pulse 70   Ht 187 cm (73.62\")   Wt 105 kg (232 lb)   SpO2 97%   BMI 30.09 kg/m²     Body mass index is 30.09 kg/m².    General:   Mental Status:  Alert   Appearance: Cooperative, in no acute distress   Build and Nutrition: Well-nourished and well developed male   Orientation: Alert and oriented to person, place and time   Posture: Normal   Gait: Normal    Integument:   Right knee: No skin lesions, no rash, no ecchymosis    Lower Extremities:   Right Knee:    Tenderness:  Medial joint line tenderness    Effusion:  1+    Swelling: None    Crepitus:  Positive    Range of motion:  Extension: 0°       Flexion: 130°  Instability:  No varus laxity, no valgus laxity, negative anterior drawer  Deformities:  Popliteal cyst        Imaging/Studies      Imaging Results (last 24 hours)     Procedure Component Value Units Date/Time    XR Knee 4+ View Right [[id number]] Resulted:  11/14/18 1515     Updated:  11/14/18 1515    Narrative:       Right Knee Radiographs  Indication: right knee pain  Views: Standing AP's and skiers of both knees, with lateral and sunrise   views of the right knee    Comparison: no prior studies available    Findings:   Medial " joint space narrowing, mild medial and lateral osteophytes, with   patellofemoral spurring, and mild varus alignment.          Assessment and Plan     Darryl was seen today for follow-up.    Diagnoses and all orders for this visit:    Primary osteoarthritis of right knee  -     Cancel: XR Knee 4+ View Left  -     XR Knee 4+ View Right  -     Large Joint Arthrocentesis: R knee        I reviewed my findings with patient today.  I believe he flared up his knee, and may benefit from an intra-articular injection today.  Please see my procedure note for details.  I will see him back in 2 months, but sooner for any problems.    Of note, he had 50% improvement just a few minutes following the injection.      Return in about 2 months (around 1/14/2019).      Medical Decision Making  Management Options : prescription/IM medicine  Data/Risk: radiology tests and independent visualization of imaging, lab tests, or EMG/NCV      Prashanth Avelar MD  11/14/18  3:57 PM

## 2019-01-14 ENCOUNTER — OFFICE VISIT (OUTPATIENT)
Dept: ORTHOPEDIC SURGERY | Facility: CLINIC | Age: 69
End: 2019-01-14

## 2019-01-14 VITALS — OXYGEN SATURATION: 98 % | BODY MASS INDEX: 30.67 KG/M2 | WEIGHT: 238.98 LBS | HEIGHT: 74 IN | HEART RATE: 85 BPM

## 2019-01-14 DIAGNOSIS — M17.11 PRIMARY OSTEOARTHRITIS OF RIGHT KNEE: Primary | ICD-10-CM

## 2019-01-14 PROCEDURE — 99212 OFFICE O/P EST SF 10 MIN: CPT | Performed by: ORTHOPAEDIC SURGERY

## 2019-01-14 NOTE — PROGRESS NOTES
[facility] [place] Orthopaedic Surgery Clinic Note    Subjective     Chief Complaint   Patient presents with   • Right Knee - Follow-up     2 month f/u , status post RIGHT KNEE ARTHRSCOPY W/ PARTIAL MEDIAL AND LATERAL MENISCECTOMIES - 07/27/18        HPI    Darryl Mendoza is a 68 y.o. male.  He follows up today for his right knee.  Right knee continues to bother him, although he did have good brief relief with the injection on his last visit.  Pain is currently 3 out of 10, dull and burning, worse with walking and climbing stairs.  It is associated with swelling.      Patient Active Problem List   Diagnosis   • Sprain of medial collateral ligament of right knee   • Primary osteoarthritis of right knee     Past Medical History:   Diagnosis Date   • Dupuytren's contracture    • Hypertension    • Pain, ankle    • Smoker       Past Surgical History:   Procedure Laterality Date   • CERVICAL DISC SURGERY     • CERVICAL FUSION     • TONSILLECTOMY        Family History   Problem Relation Age of Onset   • Stroke Mother    • Hypertension Mother    • Osteoarthritis Mother    • Cancer Father    • Stroke Other    • Heart disease Other    • Hypertension Other    • Heart attack Other    • Osteoarthritis Other      Social History     Socioeconomic History   • Marital status: Unknown     Spouse name: Not on file   • Number of children: Not on file   • Years of education: Not on file   • Highest education level: Not on file   Social Needs   • Financial resource strain: Not on file   • Food insecurity - worry: Not on file   • Food insecurity - inability: Not on file   • Transportation needs - medical: Not on file   • Transportation needs - non-medical: Not on file   Occupational History   • Not on file   Tobacco Use   • Smoking status: Current Every Day Smoker   • Smokeless tobacco: Never Used   Substance and Sexual Activity   • Alcohol use: Yes     Comment: occasional   • Drug use: No   • Sexual activity: Defer   Other Topics Concern   • Not  on file   Social History Narrative   • Not on file      Current Outpatient Medications on File Prior to Visit   Medication Sig Dispense Refill   • acetaminophen (TYLENOL) 500 MG tablet Take 500 mg by mouth Take As Directed.     • Amlodipine-Valsartan-HCTZ (EXFORGE HCT) 5-160-12.5 MG tablet Take  by mouth Take As Directed.     • atorvastatin (LIPITOR) 20 MG tablet Take  by mouth Take As Directed.     • gabapentin (NEURONTIN) 400 MG capsule Take  by mouth Take As Directed.     • raNITIdine (ZANTAC) 300 MG tablet Take  by mouth Take As Directed.     • HYDROcodone-acetaminophen (NORCO) 5-325 MG per tablet      • [DISCONTINUED] penicillin v potassium (VEETID) 500 MG tablet        No current facility-administered medications on file prior to visit.       No Known Allergies     Review of Systems   Constitutional: Negative.  Negative for activity change, appetite change, chills, diaphoresis, fatigue, fever and unexpected weight change.   HENT: Negative.  Negative for congestion, dental problem, drooling, ear discharge, ear pain, facial swelling, hearing loss, mouth sores, nosebleeds, postnasal drip, rhinorrhea, sinus pressure, sneezing, sore throat, tinnitus, trouble swallowing and voice change.    Eyes: Negative.  Negative for photophobia, pain, discharge, redness, itching and visual disturbance.   Respiratory: Negative.  Negative for apnea, cough, choking, chest tightness, shortness of breath, wheezing and stridor.    Cardiovascular: Negative.  Negative for chest pain, palpitations and leg swelling.   Gastrointestinal: Negative.  Negative for abdominal distention, abdominal pain, anal bleeding, blood in stool, constipation, diarrhea, nausea, rectal pain and vomiting.   Endocrine: Negative.  Negative for cold intolerance, heat intolerance, polydipsia, polyphagia and polyuria.   Genitourinary: Negative.  Negative for decreased urine volume, difficulty urinating, dysuria, enuresis, flank pain, frequency, genital sores,  "hematuria and urgency.   Musculoskeletal: Positive for arthralgias, joint swelling and neck stiffness. Negative for back pain, gait problem, myalgias and neck pain.   Skin: Negative.  Negative for color change, pallor, rash and wound.   Allergic/Immunologic: Negative.  Negative for environmental allergies, food allergies and immunocompromised state.   Neurological: Negative.  Negative for dizziness, tremors, seizures, syncope, facial asymmetry, speech difficulty, weakness, light-headedness, numbness and headaches.   Hematological: Negative.  Negative for adenopathy. Does not bruise/bleed easily.   Psychiatric/Behavioral: Negative.  Negative for agitation, behavioral problems, confusion, decreased concentration, dysphoric mood, hallucinations, self-injury, sleep disturbance and suicidal ideas. The patient is not nervous/anxious and is not hyperactive.         Objective      Physical Exam  Pulse 85   Ht 187 cm (73.62\")   Wt 108 kg (238 lb 15.7 oz)   SpO2 98%   BMI 31.00 kg/m²     Body mass index is 31 kg/m².    General:   Mental Status:  Alert   Appearance: Cooperative, in no acute distress   Build and Nutrition: Well-nourished and well developed male   Orientation: Alert and oriented to person, place and time   Posture: Normal   Gait: Normal    Integument:   Right knee: No skin lesions, no rash, no ecchymosis    Lower Extremities:   Right Knee:    Tenderness:  None    Effusion:  None    Swelling: None    Crepitus:  Positive    Range of motion:  Extension: 0°       Flexion: 140°  Instability:  No varus laxity, no valgus laxity, negative anterior drawer  Deformities:  None          Assessment and Plan     Darryl was seen today for follow-up.    Diagnoses and all orders for this visit:    Primary osteoarthritis of right knee  -     Large Joint Arthrocentesis        I reviewed my findings with patient today.  He does have right knee arthritis, and has had brief relief with the steroid injections, and is a candidate " for Visco supplementation injections.  I will submit for approval, and see him back once they're ready for administration.    Return for After approval of the visco injection.      Medical Decision Making  Management Options : prescription/IM medicine      Prashanth Avelar MD  01/14/19  3:29 PM

## 2019-01-28 ENCOUNTER — CLINICAL SUPPORT (OUTPATIENT)
Dept: ORTHOPEDIC SURGERY | Facility: CLINIC | Age: 69
End: 2019-01-28

## 2019-01-28 DIAGNOSIS — M17.11 PRIMARY OSTEOARTHRITIS OF RIGHT KNEE: Primary | ICD-10-CM

## 2019-01-28 PROCEDURE — 20610 DRAIN/INJ JOINT/BURSA W/O US: CPT | Performed by: ORTHOPAEDIC SURGERY

## 2019-01-28 NOTE — PROGRESS NOTES
[facility] [place] Orthopaedic Surgery Clinic Note    Subjective     Chief Complaint   Patient presents with   • Follow-up     right knee orthovisc injection #1        HPI    Darryl Mendoza is a 68 y.o. male who presents for the first Orthovisc injection into the right knee.    Patient Active Problem List   Diagnosis   • Sprain of medial collateral ligament of right knee   • Primary osteoarthritis of right knee     Past Medical History:   Diagnosis Date   • Dupuytren's contracture    • Hypertension    • Pain, ankle    • Smoker       Past Surgical History:   Procedure Laterality Date   • CERVICAL DISC SURGERY     • CERVICAL FUSION     • TONSILLECTOMY        Family History   Problem Relation Age of Onset   • Stroke Mother    • Hypertension Mother    • Osteoarthritis Mother    • Cancer Father    • Stroke Other    • Heart disease Other    • Hypertension Other    • Heart attack Other    • Osteoarthritis Other      Social History     Socioeconomic History   • Marital status: Unknown     Spouse name: Not on file   • Number of children: Not on file   • Years of education: Not on file   • Highest education level: Not on file   Social Needs   • Financial resource strain: Not on file   • Food insecurity - worry: Not on file   • Food insecurity - inability: Not on file   • Transportation needs - medical: Not on file   • Transportation needs - non-medical: Not on file   Occupational History   • Not on file   Tobacco Use   • Smoking status: Current Every Day Smoker   • Smokeless tobacco: Never Used   Substance and Sexual Activity   • Alcohol use: Yes     Comment: occasional   • Drug use: No   • Sexual activity: Defer   Other Topics Concern   • Not on file   Social History Narrative   • Not on file      Current Outpatient Medications on File Prior to Visit   Medication Sig Dispense Refill   • acetaminophen (TYLENOL) 500 MG tablet Take 500 mg by mouth Take As Directed.     • Amlodipine-Valsartan-HCTZ (EXFORGE HCT) 5-160-12.5 MG tablet Take   by mouth Take As Directed.     • atorvastatin (LIPITOR) 20 MG tablet Take  by mouth Take As Directed.     • gabapentin (NEURONTIN) 400 MG capsule Take  by mouth Take As Directed.     • HYDROcodone-acetaminophen (NORCO) 5-325 MG per tablet      • raNITIdine (ZANTAC) 300 MG tablet Take  by mouth Take As Directed.       No current facility-administered medications on file prior to visit.       No Known Allergies     Review of Systems     Objective      Physical Exam  There were no vitals taken for this visit.    There is no height or weight on file to calculate BMI.    General:   Mental Status:  Alert   Appearance: Cooperative, in no acute distress   Posture: Normal    Assessment and Plan     Darryl was seen today for follow-up.    Diagnoses and all orders for this visit:    Primary osteoarthritis of right knee        Administration of viscosupplementation today.  Please see my procedure note for details.    Return in about 1 week (around 2/4/2019) for Injection.    Prashanth Avelar MD  01/28/19  10:39 AM

## 2019-01-28 NOTE — PROGRESS NOTES
Procedure   Large Joint Arthrocentesis: R knee  Date/Time: 1/28/2019 10:15 AM  Consent given by: patient  Site marked: site marked  Timeout: Immediately prior to procedure a time out was called to verify the correct patient, procedure, equipment, support staff and site/side marked as required   Supporting Documentation  Indications: pain   Procedure Details  Location: knee - R knee  Preparation: Patient was prepped and draped in the usual sterile fashion  Needle size: 22 G  Approach: anterolateral  Medications administered: 30 mg Hyaluronan 30 MG/2ML  Patient tolerance: patient tolerated the procedure well with no immediate complications

## 2019-02-04 ENCOUNTER — CLINICAL SUPPORT (OUTPATIENT)
Dept: ORTHOPEDIC SURGERY | Facility: CLINIC | Age: 69
End: 2019-02-04

## 2019-02-04 DIAGNOSIS — M17.11 PRIMARY OSTEOARTHRITIS OF RIGHT KNEE: Primary | ICD-10-CM

## 2019-02-04 PROCEDURE — 20610 DRAIN/INJ JOINT/BURSA W/O US: CPT | Performed by: ORTHOPAEDIC SURGERY

## 2019-02-04 NOTE — PROGRESS NOTES
Procedure   Large Joint Arthrocentesis: R knee  Date/Time: 2/4/2019 3:31 PM  Consent given by: patient  Site marked: site marked  Timeout: Immediately prior to procedure a time out was called to verify the correct patient, procedure, equipment, support staff and site/side marked as required   Supporting Documentation  Indications: pain   Procedure Details  Location: knee - R knee  Preparation: Patient was prepped and draped in the usual sterile fashion  Needle size: 22 G  Approach: anterolateral  Medications administered: 30 mg Hyaluronan 30 MG/2ML  Patient tolerance: patient tolerated the procedure well with no immediate complications

## 2019-02-04 NOTE — PROGRESS NOTES
[facility] [place] Orthopaedic Surgery Clinic Note    Subjective     Chief Complaint   Patient presents with   • Right Knee - Follow-up     Primary Osteoarthritis of Right Knee; Orthovisc Injection #2        HPI    Darryl Mendoza is a 68 y.o. male who presents for the second Orthovisc injection into the right knee.  No relief so far with the first injection.    Patient Active Problem List   Diagnosis   • Sprain of medial collateral ligament of right knee   • Primary osteoarthritis of right knee     Past Medical History:   Diagnosis Date   • Dupuytren's contracture    • Hypertension    • Pain, ankle    • Smoker       Past Surgical History:   Procedure Laterality Date   • CERVICAL DISC SURGERY     • CERVICAL FUSION     • TONSILLECTOMY        Family History   Problem Relation Age of Onset   • Stroke Mother    • Hypertension Mother    • Osteoarthritis Mother    • Cancer Father    • Stroke Other    • Heart disease Other    • Hypertension Other    • Heart attack Other    • Osteoarthritis Other      Social History     Socioeconomic History   • Marital status: Unknown     Spouse name: Not on file   • Number of children: Not on file   • Years of education: Not on file   • Highest education level: Not on file   Social Needs   • Financial resource strain: Not on file   • Food insecurity - worry: Not on file   • Food insecurity - inability: Not on file   • Transportation needs - medical: Not on file   • Transportation needs - non-medical: Not on file   Occupational History   • Not on file   Tobacco Use   • Smoking status: Current Every Day Smoker   • Smokeless tobacco: Never Used   Substance and Sexual Activity   • Alcohol use: Yes     Comment: occasional   • Drug use: No   • Sexual activity: Defer   Other Topics Concern   • Not on file   Social History Narrative   • Not on file      Current Outpatient Medications on File Prior to Visit   Medication Sig Dispense Refill   • acetaminophen (TYLENOL) 500 MG tablet Take 500 mg by mouth Take  As Directed.     • Amlodipine-Valsartan-HCTZ (EXFORGE HCT) 5-160-12.5 MG tablet Take  by mouth Take As Directed.     • atorvastatin (LIPITOR) 20 MG tablet Take  by mouth Take As Directed.     • gabapentin (NEURONTIN) 400 MG capsule Take  by mouth Take As Directed.     • HYDROcodone-acetaminophen (NORCO) 5-325 MG per tablet      • raNITIdine (ZANTAC) 300 MG tablet Take  by mouth Take As Directed.       No current facility-administered medications on file prior to visit.       No Known Allergies     Review of Systems   Constitutional: Negative.  Negative for activity change, appetite change, chills, diaphoresis, fatigue, fever and unexpected weight change.   HENT: Negative.  Negative for congestion, dental problem, drooling, ear discharge, ear pain, facial swelling, hearing loss, mouth sores, nosebleeds, postnasal drip, rhinorrhea, sinus pressure, sneezing, sore throat, tinnitus, trouble swallowing and voice change.    Eyes: Negative.  Negative for photophobia, pain, discharge, redness, itching and visual disturbance.   Respiratory: Negative.  Negative for apnea, cough, choking, chest tightness, shortness of breath, wheezing and stridor.    Cardiovascular: Negative.  Negative for chest pain, palpitations and leg swelling.   Gastrointestinal: Negative.  Negative for abdominal distention, abdominal pain, anal bleeding, blood in stool, constipation, diarrhea, nausea, rectal pain and vomiting.   Endocrine: Negative.  Negative for cold intolerance, heat intolerance, polydipsia, polyphagia and polyuria.   Genitourinary: Negative.  Negative for decreased urine volume, difficulty urinating, dysuria, enuresis, flank pain, frequency, genital sores, hematuria and urgency.   Musculoskeletal: Positive for joint swelling. Negative for arthralgias, back pain, gait problem, myalgias, neck pain and neck stiffness.   Skin: Negative.  Negative for color change, pallor, rash and wound.   Allergic/Immunologic: Negative.  Negative for  environmental allergies, food allergies and immunocompromised state.   Neurological: Negative.  Negative for dizziness, tremors, seizures, syncope, facial asymmetry, speech difficulty, weakness, light-headedness, numbness and headaches.   Hematological: Negative.  Negative for adenopathy. Does not bruise/bleed easily.   Psychiatric/Behavioral: Negative.  Negative for agitation, behavioral problems, confusion, decreased concentration, dysphoric mood, hallucinations, self-injury, sleep disturbance and suicidal ideas. The patient is not nervous/anxious and is not hyperactive.         Objective      Physical Exam  There were no vitals taken for this visit.    There is no height or weight on file to calculate BMI.    General:   Mental Status:  Alert   Appearance: Cooperative, in no acute distress   Posture: Normal    Assessment and Plan     Darryl was seen today for follow-up.    Diagnoses and all orders for this visit:    Primary osteoarthritis of right knee  -     Large Joint Arthrocentesis: R knee        Administration of viscosupplementation today.  Please see my procedure note for details.    Return in about 1 week (around 2/11/2019) for Injection.    Prashanth Avelar MD  02/04/19  3:47 PM

## 2019-02-11 ENCOUNTER — CLINICAL SUPPORT (OUTPATIENT)
Dept: ORTHOPEDIC SURGERY | Facility: CLINIC | Age: 69
End: 2019-02-11

## 2019-02-11 DIAGNOSIS — M17.11 PRIMARY OSTEOARTHRITIS OF RIGHT KNEE: Primary | ICD-10-CM

## 2019-02-11 PROCEDURE — 20610 DRAIN/INJ JOINT/BURSA W/O US: CPT | Performed by: ORTHOPAEDIC SURGERY

## 2019-02-11 NOTE — PROGRESS NOTES
Procedure   Large Joint Arthrocentesis: R knee  Date/Time: 2/11/2019 9:44 AM  Consent given by: patient  Site marked: site marked  Timeout: Immediately prior to procedure a time out was called to verify the correct patient, procedure, equipment, support staff and site/side marked as required   Supporting Documentation  Indications: pain   Procedure Details  Location: knee - R knee  Preparation: Patient was prepped and draped in the usual sterile fashion  Needle size: 22 G  Approach: anterolateral  Medications administered: 30 mg Hyaluronan 30 MG/2ML  Patient tolerance: patient tolerated the procedure well with no immediate complications

## 2019-02-11 NOTE — PROGRESS NOTES
[facility] [place] [place] Orthopaedic Surgery Clinic Note    Subjective     Chief Complaint   Patient presents with   • Injections     Orthovisc injection Right knee #3        HPI    Darryl Mendoza is a 68 y.o. male who presents for the third Orthovisc injection into the right knee.  Of note, he has seen some improvement so far.    Patient Active Problem List   Diagnosis   • Sprain of medial collateral ligament of right knee   • Primary osteoarthritis of right knee     Past Medical History:   Diagnosis Date   • Dupuytren's contracture    • Hypertension    • Pain, ankle    • Smoker       Past Surgical History:   Procedure Laterality Date   • CERVICAL DISC SURGERY     • CERVICAL FUSION     • TONSILLECTOMY        Family History   Problem Relation Age of Onset   • Stroke Mother    • Hypertension Mother    • Osteoarthritis Mother    • Cancer Father    • Stroke Other    • Heart disease Other    • Hypertension Other    • Heart attack Other    • Osteoarthritis Other      Social History     Socioeconomic History   • Marital status: Unknown     Spouse name: Not on file   • Number of children: Not on file   • Years of education: Not on file   • Highest education level: Not on file   Social Needs   • Financial resource strain: Not on file   • Food insecurity - worry: Not on file   • Food insecurity - inability: Not on file   • Transportation needs - medical: Not on file   • Transportation needs - non-medical: Not on file   Occupational History   • Not on file   Tobacco Use   • Smoking status: Current Every Day Smoker   • Smokeless tobacco: Never Used   Substance and Sexual Activity   • Alcohol use: Yes     Comment: occasional   • Drug use: No   • Sexual activity: Defer   Other Topics Concern   • Not on file   Social History Narrative   • Not on file      Current Outpatient Medications on File Prior to Visit   Medication Sig Dispense Refill   • acetaminophen (TYLENOL) 500 MG tablet Take 500 mg by mouth Take As Directed.     •  Amlodipine-Valsartan-HCTZ (EXFORGE HCT) 5-160-12.5 MG tablet Take  by mouth Take As Directed.     • atorvastatin (LIPITOR) 20 MG tablet Take  by mouth Take As Directed.     • gabapentin (NEURONTIN) 400 MG capsule Take  by mouth Take As Directed.     • HYDROcodone-acetaminophen (NORCO) 5-325 MG per tablet      • raNITIdine (ZANTAC) 300 MG tablet Take  by mouth Take As Directed.       No current facility-administered medications on file prior to visit.       No Known Allergies     Review of Systems   Constitutional: Negative for activity change, appetite change, chills, diaphoresis, fatigue, fever and unexpected weight change.   HENT: Negative for congestion, dental problem, drooling, ear discharge, ear pain, facial swelling, hearing loss, mouth sores, nosebleeds, postnasal drip, rhinorrhea, sinus pressure, sneezing, sore throat, tinnitus, trouble swallowing and voice change.    Eyes: Negative for photophobia, pain, discharge, redness, itching and visual disturbance.   Respiratory: Negative for apnea, cough, choking, chest tightness, shortness of breath, wheezing and stridor.    Cardiovascular: Negative for chest pain, palpitations and leg swelling.   Gastrointestinal: Negative for abdominal distention, abdominal pain, anal bleeding, blood in stool, constipation, diarrhea, nausea, rectal pain and vomiting.   Endocrine: Negative for cold intolerance, heat intolerance, polydipsia, polyphagia and polyuria.   Genitourinary: Negative for decreased urine volume, difficulty urinating, dysuria, enuresis, flank pain, frequency, genital sores, hematuria and urgency.   Musculoskeletal: Positive for arthralgias (knee pain). Negative for back pain, gait problem, joint swelling, myalgias, neck pain and neck stiffness.   Skin: Negative for color change, pallor, rash and wound.   Allergic/Immunologic: Negative for environmental allergies, food allergies and immunocompromised state.   Neurological: Negative for dizziness, tremors,  seizures, syncope, facial asymmetry, speech difficulty, weakness, light-headedness, numbness and headaches.   Hematological: Negative for adenopathy. Does not bruise/bleed easily.   Psychiatric/Behavioral: Negative for agitation, behavioral problems, confusion, decreased concentration, dysphoric mood, hallucinations, self-injury, sleep disturbance and suicidal ideas. The patient is not nervous/anxious and is not hyperactive.         Objective      Physical Exam  There were no vitals taken for this visit.    There is no height or weight on file to calculate BMI.    General:   Mental Status:  Alert   Appearance: Cooperative, in no acute distress   Posture: Normal    Assessment and Plan     Darryl was seen today for injections.    Diagnoses and all orders for this visit:    Primary osteoarthritis of right knee  -     Large Joint Arthrocentesis: R knee        Administration of viscosupplementation today.  Please see my procedure note for details.    Return in about 3 months (around 5/11/2019).    Prashanth Avelar MD  02/11/19  9:52 AM

## 2019-05-13 ENCOUNTER — OFFICE VISIT (OUTPATIENT)
Dept: ORTHOPEDIC SURGERY | Facility: CLINIC | Age: 69
End: 2019-05-13

## 2019-05-13 VITALS — OXYGEN SATURATION: 98 % | HEIGHT: 74 IN | HEART RATE: 84 BPM | BODY MASS INDEX: 29.17 KG/M2 | WEIGHT: 227.29 LBS

## 2019-05-13 DIAGNOSIS — M17.11 PRIMARY OSTEOARTHRITIS OF RIGHT KNEE: Primary | ICD-10-CM

## 2019-05-13 PROCEDURE — 99212 OFFICE O/P EST SF 10 MIN: CPT | Performed by: ORTHOPAEDIC SURGERY

## 2019-05-13 NOTE — PROGRESS NOTES
[facility] [place] [facility] [place]    Subjective     Chief Complaint   Patient presents with   • Right Knee - Follow-up     3 month follow up, Orthovisc completed in Feb 2019.        HPI    Darryl Mendoza is a 68 y.o. male.  He follows up today for his right knee.  He did have good relief with the Orthovisc injection series, and knee is doing well today.  3 out of 10 pain, which is aching, associated with stiffness.      Patient Active Problem List   Diagnosis   • Sprain of medial collateral ligament of right knee   • Primary osteoarthritis of right knee     Past Medical History:   Diagnosis Date   • Dupuytren's contracture    • Hypertension    • Pain, ankle    • Smoker       Past Surgical History:   Procedure Laterality Date   • CERVICAL DISC SURGERY     • CERVICAL FUSION     • TONSILLECTOMY        Family History   Problem Relation Age of Onset   • Stroke Mother    • Hypertension Mother    • Osteoarthritis Mother    • Cancer Father    • Stroke Other    • Heart disease Other    • Hypertension Other    • Heart attack Other    • Osteoarthritis Other      Social History     Socioeconomic History   • Marital status: Unknown     Spouse name: Not on file   • Number of children: Not on file   • Years of education: Not on file   • Highest education level: Not on file   Tobacco Use   • Smoking status: Current Every Day Smoker   • Smokeless tobacco: Never Used   Substance and Sexual Activity   • Alcohol use: Yes     Comment: occasional   • Drug use: No   • Sexual activity: Defer      Current Outpatient Medications on File Prior to Visit   Medication Sig Dispense Refill   • acetaminophen (TYLENOL) 500 MG tablet Take 500 mg by mouth Take As Directed.     • Amlodipine-Valsartan-HCTZ (EXFORGE HCT) 5-160-12.5 MG tablet Take  by mouth Take As Directed.     • atorvastatin (LIPITOR) 20 MG tablet Take  by mouth Take As Directed.     • gabapentin (NEURONTIN) 400 MG capsule Take  by mouth Take As Directed.     •  "HYDROcodone-acetaminophen (NORCO) 5-325 MG per tablet      • raNITIdine (ZANTAC) 300 MG tablet Take  by mouth Take As Directed.       No current facility-administered medications on file prior to visit.       No Known Allergies     Review of Systems   Constitutional: Positive for activity change.   HENT: Positive for sinus pain.    Eyes: Negative.    Respiratory: Negative.    Cardiovascular: Negative.    Gastrointestinal: Negative.    Endocrine: Negative.    Genitourinary: Negative.    Musculoskeletal: Positive for arthralgias and neck pain.   Skin: Negative.    Allergic/Immunologic: Negative.    Neurological: Negative.    Hematological: Negative.    Psychiatric/Behavioral: Negative.         Objective      Physical Exam  Pulse 84   Ht 187 cm (73.62\")   Wt 103 kg (227 lb 4.7 oz)   SpO2 98%   BMI 29.48 kg/m²     Body mass index is 29.48 kg/m².    General:   Mental Status:  Alert   Appearance: Cooperative, in no acute distress   Build and Nutrition: Well-nourished well-developed male   Orientation: Alert and oriented to person, place and time   Posture: Normal   Gait: Normal    Integument:   Right knee: Old portal sites are well-healed    Lower Extremities:   Right Knee:    Tenderness:  Medial joint line tenderness    Effusion:  None    Swelling: None    Range of motion:  Extension: 0°       Flexion: 140°  Instability:  No varus laxity, no valgus laxity, negative anterior drawer  Deformities:  None        Assessment and Plan     Darryl was seen today for follow-up.    Diagnoses and all orders for this visit:    Primary osteoarthritis of right knee        1. Primary osteoarthritis of right knee        I reviewed my findings with patient today.  His right knee responded well to the Visco supplementation injection series.  I will see him back in September, but sooner for any problems.  Repeat injections can be considered.    Return in about 4 months (around 9/13/2019).          Prashanth Avelar MD  05/13/19  10:01 " AM

## 2019-09-16 ENCOUNTER — OFFICE VISIT (OUTPATIENT)
Dept: ORTHOPEDIC SURGERY | Facility: CLINIC | Age: 69
End: 2019-09-16

## 2019-09-16 VITALS — WEIGHT: 213.8 LBS | OXYGEN SATURATION: 97 % | HEIGHT: 74 IN | HEART RATE: 85 BPM | BODY MASS INDEX: 27.44 KG/M2

## 2019-09-16 DIAGNOSIS — M17.11 PRIMARY OSTEOARTHRITIS OF RIGHT KNEE: Primary | ICD-10-CM

## 2019-09-16 DIAGNOSIS — M25.522 LEFT ELBOW PAIN: ICD-10-CM

## 2019-09-16 PROCEDURE — 99214 OFFICE O/P EST MOD 30 MIN: CPT | Performed by: ORTHOPAEDIC SURGERY

## 2019-09-16 PROCEDURE — 20610 DRAIN/INJ JOINT/BURSA W/O US: CPT | Performed by: ORTHOPAEDIC SURGERY

## 2019-09-16 NOTE — PROGRESS NOTES
[facility] – [place] [facility] Note    Subjective     Chief Complaint   Patient presents with   • Left Elbow - Pain     Fell in July on elbow    • Follow-up     4 month follow up - Primary osteoarthritis of right knee         HPI    Darryl Mendoza is a 69 y.o. male.  He presents today for evaluation of left elbow pain and right knee pain.    Left elbow pain is a new problem, which she has noticed since he slipped and fell in July 2019, when he was on a boat, and struck his elbow.  Pain has improved somewhat since the onset, but is still lingering, with mild to moderate pain.  Pain is located along the medial aspect of the elbow.  It is associated with stiffness and swelling.    Right knee pain is been ongoing for at least a year and a half, following no particular injury.  He has a known history of arthritis in the knee.  He responded well to Visco supplementation injections in the past, would like a repeat series at this time.      Patient Active Problem List   Diagnosis   • Sprain of medial collateral ligament of right knee   • Primary osteoarthritis of right knee     Past Medical History:   Diagnosis Date   • Dupuytren's contracture    • Hypertension    • Pain, ankle    • Smoker       Past Surgical History:   Procedure Laterality Date   • CERVICAL DISC SURGERY     • CERVICAL FUSION     • TONSILLECTOMY        Family History   Problem Relation Age of Onset   • Stroke Mother    • Hypertension Mother    • Osteoarthritis Mother    • Cancer Father    • Stroke Other    • Heart disease Other    • Hypertension Other    • Heart attack Other    • Osteoarthritis Other      Social History     Socioeconomic History   • Marital status: Unknown     Spouse name: Not on file   • Number of children: Not on file   • Years of education: Not on file   • Highest education level: Not on file   Tobacco Use   • Smoking status: Current Every Day Smoker   • Smokeless tobacco: Never Used   Substance and Sexual Activity   • Alcohol use:  Yes     Comment: occasional   • Drug use: No   • Sexual activity: Defer      Current Outpatient Medications on File Prior to Visit   Medication Sig Dispense Refill   • acetaminophen (TYLENOL) 500 MG tablet Take 500 mg by mouth Take As Directed.     • Amlodipine-Valsartan-HCTZ (EXFORGE HCT) 5-160-12.5 MG tablet Take  by mouth Take As Directed.     • atorvastatin (LIPITOR) 20 MG tablet Take  by mouth Take As Directed.     • gabapentin (NEURONTIN) 400 MG capsule Take  by mouth Take As Directed.     • HYDROcodone-acetaminophen (NORCO) 5-325 MG per tablet      • raNITIdine (ZANTAC) 300 MG tablet Take  by mouth Take As Directed.       No current facility-administered medications on file prior to visit.       No Known Allergies     Review of Systems   Constitutional: Negative for activity change, appetite change, chills, diaphoresis, fatigue, fever and unexpected weight change.   HENT: Negative for congestion, dental problem, drooling, ear discharge, ear pain, facial swelling, hearing loss, mouth sores, nosebleeds, postnasal drip, rhinorrhea, sinus pressure, sneezing, sore throat, tinnitus, trouble swallowing and voice change.    Eyes: Negative for photophobia, pain, discharge, redness, itching and visual disturbance.   Respiratory: Negative for apnea, cough, choking, chest tightness, shortness of breath, wheezing and stridor.    Cardiovascular: Negative for chest pain, palpitations and leg swelling.   Gastrointestinal: Negative for abdominal distention, abdominal pain, anal bleeding, blood in stool, constipation, diarrhea, nausea, rectal pain and vomiting.   Endocrine: Negative for cold intolerance, heat intolerance, polydipsia, polyphagia and polyuria.   Genitourinary: Negative for decreased urine volume, difficulty urinating, dysuria, enuresis, flank pain, frequency, genital sores, hematuria and urgency.   Musculoskeletal: Positive for joint swelling. Negative for arthralgias, back pain, gait problem, myalgias, neck  "pain and neck stiffness.   Skin: Negative for color change, pallor, rash and wound.   Allergic/Immunologic: Negative for environmental allergies, food allergies and immunocompromised state.   Neurological: Negative for dizziness, tremors, seizures, syncope, facial asymmetry, speech difficulty, weakness, light-headedness, numbness and headaches.   Hematological: Negative for adenopathy. Does not bruise/bleed easily.   Psychiatric/Behavioral: Negative for agitation, behavioral problems, confusion, decreased concentration, dysphoric mood, hallucinations, self-injury, sleep disturbance and suicidal ideas. The patient is not nervous/anxious and is not hyperactive.         Objective      Physical Exam  Pulse 85   Ht 187 cm (73.62\")   Wt 97 kg (213 lb 12.8 oz)   SpO2 97%   BMI 27.73 kg/m²     Body mass index is 27.73 kg/m².    General:   Mental Status:  Alert   Appearance: Cooperative, in no acute distress   Build and Nutrition: Well-nourished well-developed male   Orientation: Alert and oriented to person, place and time   Posture: Normal   Gait: Normal    Integument:   Left elbow: No skin lesions, no rash, no ecchymosis    Neurologic:   Sensation:    Left hand: Intact to light touch in the digits   Motor:  Left upper extremity: 5/5 biceps, triceps, wrist flexors, wrist extensors, and interossei  Vascular:   Left upper extremity: 2+ radial pulse, prompt capillary refill    Upper Extremities:   Left Elbow:    Tenderness:  Medial elbow    Effusion:  None    Swelling:  None    Crepitus:  None    Atrophy:  None    Range of motion:  Extension:  5°        Flexion:  140°       Pronation:  90°       Supination: 90°  Instability:  None  Deformities:  None    Integument:   Right knee: No skin lesions, no rash, no ecchymosis    Lower Extremities:   Right Knee:    Tenderness:  None    Effusion:  None    Swelling: None    Crepitus:  Positive    Range of motion:  Extension: 0°       Flexion: 120°  Instability:  No varus laxity, no " valgus laxity, negative anterior drawer  Deformities:  None      Imaging/Studies  Imaging Results (last 24 hours)     Procedure Component Value Units Date/Time    XR Elbow 3+ View Left [[id number]] Resulted:  09/16/19 1008     Updated:  09/16/19 1009    Narrative:       Left Elbow Radiographs  Indication: left elbow pain  Views: AP, lateral, and oblique views of the left elbow    Comparison: no prior studies available for review    Findings:  Medial spur from the olecranon, with lateral calcification near the   radiocapitellar joint, with no acute bony abnormalities, and good   alignment.            Assessment and Plan     Darryl was seen today for follow-up and pain.    Diagnoses and all orders for this visit:    Primary osteoarthritis of right knee  -     Large Joint Arthrocentesis: R knee    Left elbow pain  -     XR Elbow 3+ View Left        1. Primary osteoarthritis of right knee    2. Left elbow pain        I reviewed my findings with the patient today.  Regarding his left elbow, he does have a spur medially, but no obvious acute bony abnormalities.  I recommended conservative treatment at this time, and if he has no improvement over the next 2 weeks, we may initiate further imaging with MRI.  He wishes to proceed with this plan also.    Regarding his right knee, he would like an Orthovisc injection series, and that was started today.  I will see him back in a week for the second injection.    Return in about 1 week (around 9/23/2019) for Injection.      Medical Decision Making  Management Options : prescription/IM medicine  Data/Risk: radiology tests and independent visualization of imaging, lab tests, or EMG/NCV      Prashanth Avelar MD  09/16/19  5:22 PM

## 2019-09-16 NOTE — PROGRESS NOTES
Procedure   Large Joint Arthrocentesis: R knee  Date/Time: 9/16/2019 10:31 AM  Consent given by: patient  Site marked: site marked  Timeout: Immediately prior to procedure a time out was called to verify the correct patient, procedure, equipment, support staff and site/side marked as required   Supporting Documentation  Indications: pain   Procedure Details  Location: knee - R knee  Preparation: Patient was prepped and draped in the usual sterile fashion  Needle size: 22 G  Approach: anterolateral  Medications administered: 30 mg Hyaluronan 30 MG/2ML  Patient tolerance: patient tolerated the procedure well with no immediate complications

## 2019-10-02 ENCOUNTER — CLINICAL SUPPORT (OUTPATIENT)
Dept: ORTHOPEDIC SURGERY | Facility: CLINIC | Age: 69
End: 2019-10-02

## 2019-10-02 DIAGNOSIS — M17.11 PRIMARY OSTEOARTHRITIS OF RIGHT KNEE: Primary | ICD-10-CM

## 2019-10-02 PROCEDURE — 20610 DRAIN/INJ JOINT/BURSA W/O US: CPT | Performed by: ORTHOPAEDIC SURGERY

## 2019-10-02 NOTE — PROGRESS NOTES
[facility] [place] [facility] [place]    Subjective     Chief Complaint   Patient presents with   • Right Knee - Follow-up     Orthovisc Injection #2  Primary Osteoarthritis of Right Knee         HPI    Darryl Mendoza is a 69 y.o. male who presents for the second Orthovisc injection in the right knee.  Of note, he seen no significant relief so far.    Patient Active Problem List   Diagnosis   • Sprain of medial collateral ligament of right knee   • Primary osteoarthritis of right knee     Past Medical History:   Diagnosis Date   • Dupuytren's contracture    • Hypertension    • Pain, ankle    • Smoker       Past Surgical History:   Procedure Laterality Date   • CERVICAL DISC SURGERY     • CERVICAL FUSION     • TONSILLECTOMY        Family History   Problem Relation Age of Onset   • Stroke Mother    • Hypertension Mother    • Osteoarthritis Mother    • Cancer Father    • Stroke Other    • Heart disease Other    • Hypertension Other    • Heart attack Other    • Osteoarthritis Other      Social History     Socioeconomic History   • Marital status: Unknown     Spouse name: Not on file   • Number of children: Not on file   • Years of education: Not on file   • Highest education level: Not on file   Tobacco Use   • Smoking status: Current Every Day Smoker   • Smokeless tobacco: Never Used   Substance and Sexual Activity   • Alcohol use: Yes     Comment: occasional   • Drug use: No   • Sexual activity: Defer      Current Outpatient Medications on File Prior to Visit   Medication Sig Dispense Refill   • acetaminophen (TYLENOL) 500 MG tablet Take 500 mg by mouth Take As Directed.     • Amlodipine-Valsartan-HCTZ (EXFORGE HCT) 5-160-12.5 MG tablet Take  by mouth Take As Directed.     • atorvastatin (LIPITOR) 20 MG tablet Take  by mouth Take As Directed.     • gabapentin (NEURONTIN) 400 MG capsule Take  by mouth Take As Directed.     • HYDROcodone-acetaminophen (NORCO) 5-325 MG per tablet      • raNITIdine (ZANTAC) 300 MG  tablet Take  by mouth Take As Directed.       No current facility-administered medications on file prior to visit.       No Known Allergies     Review of Systems   Constitutional: Negative for activity change, appetite change, chills, diaphoresis, fatigue, fever and unexpected weight change.   HENT: Negative for congestion, dental problem, drooling, ear discharge, ear pain, facial swelling, hearing loss, mouth sores, nosebleeds, postnasal drip, rhinorrhea, sinus pressure, sneezing, sore throat, tinnitus, trouble swallowing and voice change.    Eyes: Negative for photophobia, pain, discharge, redness, itching and visual disturbance.   Respiratory: Negative for apnea, cough, choking, chest tightness, shortness of breath, wheezing and stridor.    Cardiovascular: Negative for chest pain, palpitations and leg swelling.   Gastrointestinal: Negative for abdominal distention, abdominal pain, anal bleeding, blood in stool, constipation, diarrhea, nausea, rectal pain and vomiting.   Endocrine: Negative for cold intolerance, heat intolerance, polydipsia, polyphagia and polyuria.   Genitourinary: Negative for decreased urine volume, difficulty urinating, dysuria, enuresis, flank pain, frequency, genital sores, hematuria and urgency.   Musculoskeletal: Positive for arthralgias. Negative for back pain, gait problem, joint swelling, myalgias, neck pain and neck stiffness.   Skin: Negative for color change, pallor, rash and wound.   Allergic/Immunologic: Negative for environmental allergies, food allergies and immunocompromised state.   Neurological: Negative for dizziness, tremors, seizures, syncope, facial asymmetry, speech difficulty, weakness, light-headedness, numbness and headaches.   Hematological: Negative for adenopathy. Does not bruise/bleed easily.   Psychiatric/Behavioral: Negative for agitation, behavioral problems, confusion, decreased concentration, dysphoric mood, hallucinations, self-injury, sleep disturbance and  suicidal ideas. The patient is not nervous/anxious and is not hyperactive.         Objective      Physical Exam  There were no vitals taken for this visit.    There is no height or weight on file to calculate BMI.    General:   Mental Status:  Alert   Appearance: Cooperative, in no acute distress   Posture: Normal    Assessment and Plan     Darryl was seen today for follow-up.    Diagnoses and all orders for this visit:    Primary osteoarthritis of right knee  -     Large Joint Arthrocentesis: R knee        1. Primary osteoarthritis of right knee        Administration of viscosupplementation today.  Please see my procedure note for details.    Return in about 1 week (around 10/9/2019) for Injection.    Prashanth Avelar MD  10/02/19  10:51 AM

## 2019-10-02 NOTE — PROGRESS NOTES
Procedure   Large Joint Arthrocentesis: R knee  Date/Time: 10/2/2019 10:40 AM  Consent given by: patient  Site marked: site marked  Timeout: Immediately prior to procedure a time out was called to verify the correct patient, procedure, equipment, support staff and site/side marked as required   Supporting Documentation  Indications: pain   Procedure Details  Location: knee - R knee  Preparation: Patient was prepped and draped in the usual sterile fashion  Needle size: 22 G  Medications administered: 30 mg Hyaluronan 30 MG/2ML  Patient tolerance: patient tolerated the procedure well with no immediate complications

## 2019-10-07 ENCOUNTER — CLINICAL SUPPORT (OUTPATIENT)
Dept: ORTHOPEDIC SURGERY | Facility: CLINIC | Age: 69
End: 2019-10-07

## 2019-10-07 DIAGNOSIS — M17.11 PRIMARY OSTEOARTHRITIS OF RIGHT KNEE: Primary | ICD-10-CM

## 2019-10-07 DIAGNOSIS — M25.522 LEFT ELBOW PAIN: ICD-10-CM

## 2019-10-07 PROCEDURE — 20610 DRAIN/INJ JOINT/BURSA W/O US: CPT | Performed by: ORTHOPAEDIC SURGERY

## 2019-10-07 NOTE — PROGRESS NOTES
Procedure   Large Joint Arthrocentesis: R knee  Date/Time: 10/7/2019 2:25 PM  Consent given by: patient  Site marked: site marked  Timeout: Immediately prior to procedure a time out was called to verify the correct patient, procedure, equipment, support staff and site/side marked as required   Supporting Documentation  Indications: pain   Procedure Details  Location: knee - R knee  Preparation: Patient was prepped and draped in the usual sterile fashion  Needle size: 22 G  Approach: anterolateral  Medications administered: 30 mg Hyaluronan 30 MG/2ML  Patient tolerance: patient tolerated the procedure well with no immediate complications

## 2019-10-07 NOTE — PROGRESS NOTES
[facility] – [place] [facility] Note    Subjective     Chief Complaint   Patient presents with   • Follow-up     right knee orthovisc #3        HPI    Darryl Mendoza is a 69 y.o. male who presents for the third and final Orthovisc injection in the right knee.  Of note, he has seen some relief.    Patient Active Problem List   Diagnosis   • Sprain of medial collateral ligament of right knee   • Primary osteoarthritis of right knee     Past Medical History:   Diagnosis Date   • Dupuytren's contracture    • Hypertension    • Pain, ankle    • Smoker       Past Surgical History:   Procedure Laterality Date   • CERVICAL DISC SURGERY     • CERVICAL FUSION     • TONSILLECTOMY        Family History   Problem Relation Age of Onset   • Stroke Mother    • Hypertension Mother    • Osteoarthritis Mother    • Cancer Father    • Stroke Other    • Heart disease Other    • Hypertension Other    • Heart attack Other    • Osteoarthritis Other      Social History     Socioeconomic History   • Marital status: Unknown     Spouse name: Not on file   • Number of children: Not on file   • Years of education: Not on file   • Highest education level: Not on file   Tobacco Use   • Smoking status: Current Every Day Smoker   • Smokeless tobacco: Never Used   Substance and Sexual Activity   • Alcohol use: Yes     Comment: occasional   • Drug use: No   • Sexual activity: Defer      Current Outpatient Medications on File Prior to Visit   Medication Sig Dispense Refill   • acetaminophen (TYLENOL) 500 MG tablet Take 500 mg by mouth Take As Directed.     • Amlodipine-Valsartan-HCTZ (EXFORGE HCT) 5-160-12.5 MG tablet Take  by mouth Take As Directed.     • atorvastatin (LIPITOR) 20 MG tablet Take  by mouth Take As Directed.     • gabapentin (NEURONTIN) 400 MG capsule Take  by mouth Take As Directed.     • HYDROcodone-acetaminophen (NORCO) 5-325 MG per tablet      • raNITIdine (ZANTAC) 300 MG tablet Take  by mouth Take As Directed.       No current  facility-administered medications on file prior to visit.       No Known Allergies     Review of Systems     Objective      Physical Exam  There were no vitals taken for this visit.    There is no height or weight on file to calculate BMI.    General:   Mental Status:  Alert   Appearance: Cooperative, in no acute distress   Posture: Normal    Assessment and Plan     Darryl was seen today for follow-up.    Diagnoses and all orders for this visit:    Primary osteoarthritis of right knee  -     Large Joint Arthrocentesis: R knee    Left elbow pain  -     MRI Elbow Left Without Contrast; Future        1. Primary osteoarthritis of right knee    2. Left elbow pain        Administration of viscosupplementation today.  Please see my procedure note for details.    Return for After Imaging Study.    Prashanth Avelar MD  10/07/19  2:43 PM

## 2019-10-23 ENCOUNTER — HOSPITAL ENCOUNTER (OUTPATIENT)
Dept: MRI IMAGING | Facility: HOSPITAL | Age: 69
Discharge: HOME | End: 2019-10-23
Admitting: ORTHOPAEDIC SURGERY

## 2019-10-23 DIAGNOSIS — M25.522 LEFT ELBOW PAIN: ICD-10-CM

## 2019-10-23 PROCEDURE — 73221 MRI JOINT UPR EXTREM W/O DYE: CPT

## 2019-10-28 ENCOUNTER — OFFICE VISIT (OUTPATIENT)
Dept: ORTHOPEDIC SURGERY | Facility: CLINIC | Age: 69
End: 2019-10-28

## 2019-10-28 VITALS — OXYGEN SATURATION: 99 % | HEIGHT: 74 IN | HEART RATE: 81 BPM | WEIGHT: 213.85 LBS | BODY MASS INDEX: 27.44 KG/M2

## 2019-10-28 DIAGNOSIS — M77.02 MEDIAL EPICONDYLITIS OF ELBOW, LEFT: Primary | ICD-10-CM

## 2019-10-28 PROCEDURE — 99213 OFFICE O/P EST LOW 20 MIN: CPT | Performed by: ORTHOPAEDIC SURGERY

## 2019-10-28 NOTE — PROGRESS NOTES
[facility] [place] [facility] [place]    Subjective     Chief Complaint   Patient presents with   • Follow-up     Post MRI 10/23/19 - left elbow pain         HPI    Darryl Mendoza is a 69 y.o. male.  He follows up today for his left elbow.  Left elbow has improved since his last visit.  Certainly improved from the time that he injured it in July.  Here today to review the MRI results.  Pain is 1-2 out of 10 on the medial aspect of the elbow.  The pain is dull in quality.      Patient Active Problem List   Diagnosis   • Sprain of medial collateral ligament of right knee   • Primary osteoarthritis of right knee     Past Medical History:   Diagnosis Date   • Dupuytren's contracture    • Hypertension    • Pain, ankle    • Smoker       Past Surgical History:   Procedure Laterality Date   • CERVICAL DISC SURGERY     • CERVICAL FUSION     • TONSILLECTOMY        Family History   Problem Relation Age of Onset   • Stroke Mother    • Hypertension Mother    • Osteoarthritis Mother    • Cancer Father    • Stroke Other    • Heart disease Other    • Hypertension Other    • Heart attack Other    • Osteoarthritis Other      Social History     Socioeconomic History   • Marital status: Unknown     Spouse name: Not on file   • Number of children: Not on file   • Years of education: Not on file   • Highest education level: Not on file   Tobacco Use   • Smoking status: Current Every Day Smoker   • Smokeless tobacco: Never Used   Substance and Sexual Activity   • Alcohol use: Yes     Comment: occasional   • Drug use: No   • Sexual activity: Defer      Current Outpatient Medications on File Prior to Visit   Medication Sig Dispense Refill   • acetaminophen (TYLENOL) 500 MG tablet Take 500 mg by mouth Take As Directed.     • Amlodipine-Valsartan-HCTZ (EXFORGE HCT) 5-160-12.5 MG tablet Take  by mouth Take As Directed.     • atorvastatin (LIPITOR) 20 MG tablet Take  by mouth Take As Directed.     • gabapentin (NEURONTIN) 400 MG capsule  Take  by mouth Take As Directed.     • HYDROcodone-acetaminophen (NORCO) 5-325 MG per tablet      • raNITIdine (ZANTAC) 300 MG tablet Take  by mouth Take As Directed.       No current facility-administered medications on file prior to visit.       No Known Allergies     Review of Systems   Constitutional: Negative for activity change, appetite change, chills, diaphoresis, fatigue, fever and unexpected weight change.   HENT: Negative for congestion, dental problem, drooling, ear discharge, ear pain, facial swelling, hearing loss, mouth sores, nosebleeds, postnasal drip, rhinorrhea, sinus pressure, sneezing, sore throat, tinnitus, trouble swallowing and voice change.    Eyes: Negative for photophobia, pain, discharge, redness, itching and visual disturbance.   Respiratory: Negative for apnea, cough, choking, chest tightness, shortness of breath, wheezing and stridor.    Cardiovascular: Negative for chest pain, palpitations and leg swelling.   Gastrointestinal: Negative for abdominal distention, abdominal pain, anal bleeding, blood in stool, constipation, diarrhea, nausea, rectal pain and vomiting.   Endocrine: Negative for cold intolerance, heat intolerance, polydipsia, polyphagia and polyuria.   Genitourinary: Negative for decreased urine volume, difficulty urinating, dysuria, enuresis, flank pain, frequency, genital sores, hematuria and urgency.   Musculoskeletal: Positive for arthralgias. Negative for back pain, gait problem, joint swelling, myalgias, neck pain and neck stiffness.   Skin: Negative for color change, pallor, rash and wound.   Allergic/Immunologic: Negative for environmental allergies, food allergies and immunocompromised state.   Neurological: Negative for dizziness, tremors, seizures, syncope, facial asymmetry, speech difficulty, weakness, light-headedness, numbness and headaches.   Hematological: Negative for adenopathy. Does not bruise/bleed easily.   Psychiatric/Behavioral: Negative for  "agitation, behavioral problems, confusion, decreased concentration, dysphoric mood, hallucinations, self-injury, sleep disturbance and suicidal ideas. The patient is not nervous/anxious and is not hyperactive.         Objective      Physical Exam  Pulse 81   Ht 187 cm (73.62\")   Wt 97 kg (213 lb 13.5 oz)   SpO2 99%   BMI 27.74 kg/m²     Body mass index is 27.74 kg/m².    General:   Mental Status:  Alert   Appearance: Cooperative, in no acute distress   Build and Nutrition: Well-nourished well-developed male   Orientation: Alert and oriented to person, place and time   Posture: Normal   Gait: Normal    Upper Extremities:              Left Elbow:                          Tenderness:    Medial elbow                          Effusion:          None                          Swelling:          None                          Crepitus:          None                          Atrophy:           None                          Range of motion:        Extension:       5°                                                                       Flexion:           140°                                                              Pronation:        90°                                                              Supination:      90°  Instability:        None  Deformities:     None    Imaging/Studies  EXAMINATION: MRI ELBOW LEFT WO CONTRAST-     INDICATION: Chronic left elbow pain; M25.522-Pain in left elbow     TECHNIQUE: Axial gradient echo, sagittal T1 and T2 fat-sat, coronal T1  and T2 fat-sat 3-D gradient echo images and T2 images with motion  compensation technique.     COMPARISON: Left elbow plain films 09/16/2019     FINDINGS: Patient history indicates fall while on a boat, July 2019,  striking his elbow. Improved but persistent mild-to-moderate pain.  Medial elbow pain. Stiffness and swelling. Medial bone spur on plain  film but no obvious acute abnormality.                       Today's study shows no pathologic marrow signal " changes to suggest acute  or healing trauma although there is trace increased signal in the  subarticular bone of the radius, adjacent the ulna, possibly early  degenerative cyst formation. Common extensor tendon, lateral collateral  ligament appear intact. There is increased signal at the insertion of  the common flexor tendon suggesting tendinopathy or small partial tear  but not well-defined. There is medial elbow DJD and some joint space  narrowing, as well as degenerative osteophyte formation. No collateral  ligament injury is appreciated. There is a normal amount of joint fluid.  No significant soft tissue edema is appreciated.     IMPRESSION:  Common flexor tendinopathy or small partial tear. Relatively  mild elbow DJD is noted. No other evidence of acute or healing trauma.      Assessment and Plan     Darryl was seen today for follow-up.    Diagnoses and all orders for this visit:    Medial epicondylitis of elbow, left        1. Medial epicondylitis of elbow, left        I reviewed my findings with the patient today.  MRI shows flexor tendinopathy of the elbow, and symptoms have improved slowly.  We discussed treatment options today, and he will continue with conservative treatment.  We did discuss physical therapy, and at this point he is able to do activities, but will contact me if he would like to try physical therapy in the future.  I will see him back for any worsening or problems.  No surgical indications.  He is taking over-the-counter ibuprofen with relief.    Return if symptoms worsen or fail to improve.      Medical Decision Making  Management Options : over-the-counter medicine  Data/Risk: independent visualization of imaging, lab tests, or EMG/NCV      Prashanth Avelar MD  10/28/19  8:50 AM

## 2020-12-30 PROCEDURE — U0004 COV-19 TEST NON-CDC HGH THRU: HCPCS | Performed by: NURSE PRACTITIONER

## 2022-03-02 ENCOUNTER — OFFICE VISIT (OUTPATIENT)
Dept: ORTHOPEDIC SURGERY | Facility: CLINIC | Age: 72
End: 2022-03-02

## 2022-03-02 VITALS
HEIGHT: 73 IN | BODY MASS INDEX: 28.89 KG/M2 | DIASTOLIC BLOOD PRESSURE: 80 MMHG | SYSTOLIC BLOOD PRESSURE: 138 MMHG | WEIGHT: 218 LBS

## 2022-03-02 DIAGNOSIS — M17.11 PRIMARY OSTEOARTHRITIS OF RIGHT KNEE: Primary | ICD-10-CM

## 2022-03-02 PROCEDURE — 20610 DRAIN/INJ JOINT/BURSA W/O US: CPT | Performed by: ORTHOPAEDIC SURGERY

## 2022-03-02 PROCEDURE — 99213 OFFICE O/P EST LOW 20 MIN: CPT | Performed by: ORTHOPAEDIC SURGERY

## 2022-03-02 RX ORDER — ATORVASTATIN CALCIUM 20 MG/1
TABLET, FILM COATED ORAL
COMMUNITY

## 2022-03-02 RX ORDER — FAMOTIDINE 20 MG/1
10 TABLET, FILM COATED ORAL 2 TIMES DAILY PRN
COMMUNITY

## 2022-03-02 RX ORDER — AMLODIPINE VALSARTAN AND HYDROCHLOROTHIAZIDE 5; 160; 12.5 MG/1; MG/1; MG/1
TABLET, FILM COATED ORAL
COMMUNITY

## 2022-03-02 RX ORDER — FINASTERIDE 5 MG/1
TABLET, FILM COATED ORAL
COMMUNITY
Start: 2021-12-20

## 2022-03-02 RX ORDER — TAMSULOSIN HYDROCHLORIDE 0.4 MG/1
1 CAPSULE ORAL
COMMUNITY

## 2022-03-02 RX ORDER — ACETAMINOPHEN 325 MG/1
1 TABLET ORAL
COMMUNITY

## 2022-03-02 NOTE — PROGRESS NOTES
Procedure   Large Joint Arthrocentesis: R knee  Date/Time: 3/2/2022 3:34 PM  Consent given by: patient  Site marked: site marked  Timeout: Immediately prior to procedure a time out was called to verify the correct patient, procedure, equipment, support staff and site/side marked as required   Supporting Documentation  Indications: pain   Procedure Details  Location: knee - R knee  Preparation: Patient was prepped and draped in the usual sterile fashion  Needle size: 22 G  Approach: anterolateral  Medications administered: 30 mg Hyaluronan 30 MG/2ML  Patient tolerance: patient tolerated the procedure well with no immediate complications

## 2022-03-02 NOTE — PROGRESS NOTES
[facility] – [place] [facility] Note    Subjective     Chief Complaint   Patient presents with   • Follow-up      osteoarthritis of right knee         HPI    It has been 2  year(s) since Mr. Mendoza's last visit. He returns to clinic today for follow-up of right knee pain. The issue has been ongoing for 5 year(s). He rates his pain a 5/10 on the pain scale. Previous/current treatments: NSAIDS. Current symptoms: pain and stiffness. The pain is worse with walking, standing and climbing stairs; resting, heat and pain medication and/or NSAID improve the pain. Overall, he is doing the same.  Interested in an injection if appropriate.  He had good relief with Orthovisc injections in the past.    I have reviewed the following portions of the patient's history and agree with: History of Present Illness and Review of Systems    Patient Active Problem List   Diagnosis   • Sprain of medial collateral ligament of right knee   • Primary osteoarthritis of right knee     Past Medical History:   Diagnosis Date   • Dupuytren's contracture    • Hypertension    • Pain, ankle    • Smoker       Past Surgical History:   Procedure Laterality Date   • CERVICAL DISC SURGERY     • CERVICAL FUSION     • TONSILLECTOMY        Family History   Problem Relation Age of Onset   • Stroke Mother    • Hypertension Mother    • Osteoarthritis Mother    • Cancer Father    • Stroke Other    • Heart disease Other    • Hypertension Other    • Heart attack Other    • Osteoarthritis Other      Social History     Socioeconomic History   • Marital status: Unknown   Tobacco Use   • Smoking status: Current Every Day Smoker   • Smokeless tobacco: Never Used   Substance and Sexual Activity   • Alcohol use: Yes     Comment: occasional   • Drug use: No   • Sexual activity: Defer      Current Outpatient Medications on File Prior to Visit   Medication Sig Dispense Refill   • acetaminophen (TYLENOL) 325 MG tablet Take 1 tablet by mouth.     • acetaminophen (TYLENOL)  "500 MG tablet Take 500 mg by mouth Take As Directed.     • amLODIPine-Valsartan-HCTZ (Exforge HCT) 5-160-12.5 MG tablet Exforge HCT 5 mg-160 mg-12.5 mg tablet   Take 1 tablet every day by oral route for 90 days.     • atorvastatin (LIPITOR) 20 MG tablet atorvastatin 20 mg tablet   Take 1 tablet every day by oral route for 90 days.     • famotidine (PEPCID) 20 MG tablet Every 12 (Twelve) Hours.     • finasteride (PROSCAR) 5 MG tablet      • tamsulosin (FLOMAX) 0.4 MG capsule 24 hr capsule Take 1 capsule by mouth.     • [DISCONTINUED] Amlodipine-Valsartan-HCTZ (EXFORGE HCT) 5-160-12.5 MG tablet Take  by mouth Take As Directed.     • [DISCONTINUED] atorvastatin (LIPITOR) 20 MG tablet Take  by mouth Take As Directed.     • [DISCONTINUED] FINASTERIDE PO Take  by mouth.     • [DISCONTINUED] HYDROcodone-acetaminophen (NORCO) 5-325 MG per tablet      • [DISCONTINUED] tamsulosin (FLOMAX) 0.4 MG capsule 24 hr capsule Take 1 capsule by mouth Daily.       No current facility-administered medications on file prior to visit.      No Known Allergies     Review of Systems   Constitutional: Negative.    HENT: Positive for tinnitus.    Eyes: Negative.    Respiratory: Negative.    Cardiovascular: Negative.    Gastrointestinal: Negative.    Endocrine: Negative.    Genitourinary: Negative.    Musculoskeletal: Positive for arthralgias and neck pain.   Skin: Negative.    Allergic/Immunologic: Negative.    Neurological: Negative.    Hematological: Negative.    Psychiatric/Behavioral: Negative.         Objective      Physical Exam  /80   Ht 185.4 cm (73\")   Wt 98.9 kg (218 lb)   BMI 28.76 kg/m²     Body mass index is 28.76 kg/m².    General:   Mental Status:  Alert   Appearance: Cooperative, in no acute distress   Build and Nutrition: Well-nourished well-developed male   Orientation: Alert and oriented to person, place and time   Posture: Normal   Gait: Limp on the right    Integument:   Right knee: No skin lesions, no rash, no " ecchymosis    Lower Extremities:   Right Knee:    Tenderness:  None    Effusion:  1+    Swelling: None    Crepitus:  Positive    Range of motion:  Extension: 0°       Flexion: 125°  Instability:  No varus laxity, no valgus laxity, negative anterior drawer  Deformities:  Mild varus      Imaging/Studies  Imaging Results (Last 24 Hours)     Procedure Component Value Units Date/Time    XR Knee 4+ View Right [[id number]] Resulted: 03/02/22 1531     Updated: 03/02/22 1531            Assessment and Plan     Diagnoses and all orders for this visit:    1. Primary osteoarthritis of right knee (Primary)  -     XR Knee 4+ View Right    Other orders  -     Large Joint Arthrocentesis: R knee        1. Primary osteoarthritis of right knee        I reviewed my findings with the patient.  He is bothered with right knee arthritis, and has responded well to Orthovisc injections in the past.  He would like to start the series today, and this was initiated after approval.  I will see him back in a week for the second injection, but sooner for any problems.    Procedure Note:  The potential benefits of performing a therapeutic knee joint visco supplementation injection, as well as potential risks (including, but not limited to infection, swelling, pain, bleeding, bruising, nerve/blood vessel damage, and pseudoseptic reaction) have been discussed with the patient.  After informed consent, timeout procedure was performed, and the skin on the right knee was prepped with chlorhexidine soap and alcohol, after which ethyl chloride was applied to the skin at the injection site. Via the anterolateral approach, Orthovisc was injected into the knee joint.  The patient tolerated the procedure well. There were no complications.  Band-Aid was applied to the injection site. Post-procedural instructions discussed with the patient and/or their caregiver.      Return in about 1 week (around 3/9/2022) for Injection.      Prashanth Avelar,  MD  03/02/22  15:34 EST

## 2022-03-09 ENCOUNTER — CLINICAL SUPPORT (OUTPATIENT)
Dept: ORTHOPEDIC SURGERY | Facility: CLINIC | Age: 72
End: 2022-03-09

## 2022-03-09 DIAGNOSIS — M17.11 PRIMARY OSTEOARTHRITIS OF RIGHT KNEE: Primary | ICD-10-CM

## 2022-03-09 PROCEDURE — 20610 DRAIN/INJ JOINT/BURSA W/O US: CPT | Performed by: ORTHOPAEDIC SURGERY

## 2022-03-09 NOTE — PROGRESS NOTES
Procedure   Large Joint Arthrocentesis: R knee  Date/Time: 3/9/2022 10:22 AM  Consent given by: patient  Site marked: site marked  Timeout: Immediately prior to procedure a time out was called to verify the correct patient, procedure, equipment, support staff and site/side marked as required   Supporting Documentation  Indications: pain   Procedure Details  Location: knee - R knee  Preparation: Patient was prepped and draped in the usual sterile fashion  Needle size: 22 G  Approach: anterolateral  Medications administered: 30 mg Hyaluronan 30 MG/2ML  Patient tolerance: patient tolerated the procedure well with no immediate complications

## 2022-03-09 NOTE — PROGRESS NOTES
[facility] [place] [facility] Note    Subjective     Chief Complaint   Patient presents with   • Injections     ORTHOVISC INJECTION #2 (RIGHT KNEE)        HPI    Darryl Mendoza is a 71 y.o. male who presents for the second Orthovisc injection into the right knee.  Of note, he has not seen any significant improvement so far.    Patient Active Problem List   Diagnosis   • Sprain of medial collateral ligament of right knee   • Primary osteoarthritis of right knee     Past Medical History:   Diagnosis Date   • Dupuytren's contracture    • Hypertension    • Pain, ankle    • Smoker       Past Surgical History:   Procedure Laterality Date   • CERVICAL DISC SURGERY     • CERVICAL FUSION     • TONSILLECTOMY        Family History   Problem Relation Age of Onset   • Stroke Mother    • Hypertension Mother    • Osteoarthritis Mother    • Cancer Father    • Stroke Other    • Heart disease Other    • Hypertension Other    • Heart attack Other    • Osteoarthritis Other      Social History     Socioeconomic History   • Marital status: Unknown   Tobacco Use   • Smoking status: Current Every Day Smoker   • Smokeless tobacco: Never Used   Substance and Sexual Activity   • Alcohol use: Yes     Comment: occasional   • Drug use: No   • Sexual activity: Defer      Current Outpatient Medications on File Prior to Visit   Medication Sig Dispense Refill   • acetaminophen (TYLENOL) 325 MG tablet Take 1 tablet by mouth.     • acetaminophen (TYLENOL) 500 MG tablet Take 500 mg by mouth Take As Directed.     • amLODIPine-Valsartan-HCTZ (Exforge HCT) 5-160-12.5 MG tablet Exforge HCT 5 mg-160 mg-12.5 mg tablet   Take 1 tablet every day by oral route for 90 days.     • atorvastatin (LIPITOR) 20 MG tablet atorvastatin 20 mg tablet   Take 1 tablet every day by oral route for 90 days.     • famotidine (PEPCID) 20 MG tablet Every 12 (Twelve) Hours.     • finasteride (PROSCAR) 5 MG tablet      • tamsulosin (FLOMAX) 0.4 MG capsule 24 hr capsule Take  1 capsule by mouth.       No current facility-administered medications on file prior to visit.      No Known Allergies     Review of Systems     Objective      Physical Exam  There were no vitals taken for this visit.    There is no height or weight on file to calculate BMI.    General:   Mental Status:  Alert   Appearance: Cooperative, in no acute distress   Posture: Normal    Assessment and Plan     Diagnoses and all orders for this visit:    1. Primary osteoarthritis of right knee (Primary)  -     Large Joint Arthrocentesis: R knee        1. Primary osteoarthritis of right knee        Procedure Note:  The potential benefits of performing a therapeutic knee joint visco supplementation injection, as well as potential risks (including, but not limited to infection, swelling, pain, bleeding, bruising, nerve/blood vessel damage, and pseudoseptic reaction) have been discussed with the patient.  After informed consent, timeout procedure was performed, and the skin on the right knee was prepped with chlorhexidine soap and alcohol, after which ethyl chloride was applied to the skin at the injection site. Via the anterolateral approach, Orthovisc was injected into the knee joint.  The patient tolerated the procedure well. There were no complications.  Band-Aid was applied to the injection site. Post-procedural instructions discussed with the patient and/or their caregiver.    Return in about 1 week (around 3/16/2022) for Injection.    Prashanth Avelar MD  03/09/22  10:40 EST

## 2022-03-16 ENCOUNTER — CLINICAL SUPPORT (OUTPATIENT)
Dept: ORTHOPEDIC SURGERY | Facility: CLINIC | Age: 72
End: 2022-03-16

## 2022-03-16 DIAGNOSIS — M17.11 PRIMARY OSTEOARTHRITIS OF RIGHT KNEE: Primary | ICD-10-CM

## 2022-03-16 PROCEDURE — 20610 DRAIN/INJ JOINT/BURSA W/O US: CPT | Performed by: ORTHOPAEDIC SURGERY

## 2022-03-16 NOTE — PROGRESS NOTES
Procedure   Large Joint Arthrocentesis: R knee  Date/Time: 3/16/2022 10:43 AM  Consent given by: patient  Site marked: site marked  Timeout: Immediately prior to procedure a time out was called to verify the correct patient, procedure, equipment, support staff and site/side marked as required   Supporting Documentation  Indications: pain   Procedure Details  Location: knee - R knee  Preparation: Patient was prepped and draped in the usual sterile fashion  Needle size: 22 G  Approach: anterolateral  Medications administered: 30 mg Hyaluronan 30 MG/2ML  Patient tolerance: patient tolerated the procedure well with no immediate complications

## 2022-03-16 NOTE — PROGRESS NOTES
[facility] [place] [facility] Note    Subjective     Chief Complaint   Patient presents with   • Injections     Right knee Orthovisc injection #3         HPI    Darryl Mendoza is a 71 y.o. male who presents for the third and final Orthovisc injection into the right knee.  Of note, no significant improvement so far.    Patient Active Problem List   Diagnosis   • Sprain of medial collateral ligament of right knee   • Primary osteoarthritis of right knee     Past Medical History:   Diagnosis Date   • Dupuytren's contracture    • Hypertension    • Pain, ankle    • Smoker       Past Surgical History:   Procedure Laterality Date   • CERVICAL DISC SURGERY     • CERVICAL FUSION     • TONSILLECTOMY        Family History   Problem Relation Age of Onset   • Stroke Mother    • Hypertension Mother    • Osteoarthritis Mother    • Cancer Father    • Stroke Other    • Heart disease Other    • Hypertension Other    • Heart attack Other    • Osteoarthritis Other      Social History     Socioeconomic History   • Marital status: Unknown   Tobacco Use   • Smoking status: Current Every Day Smoker   • Smokeless tobacco: Never Used   Substance and Sexual Activity   • Alcohol use: Yes     Comment: occasional   • Drug use: No   • Sexual activity: Defer      Current Outpatient Medications on File Prior to Visit   Medication Sig Dispense Refill   • acetaminophen (TYLENOL) 325 MG tablet Take 1 tablet by mouth.     • acetaminophen (TYLENOL) 500 MG tablet Take 500 mg by mouth Take As Directed.     • amLODIPine-Valsartan-HCTZ (Exforge HCT) 5-160-12.5 MG tablet Exforge HCT 5 mg-160 mg-12.5 mg tablet   Take 1 tablet every day by oral route for 90 days.     • atorvastatin (LIPITOR) 20 MG tablet atorvastatin 20 mg tablet   Take 1 tablet every day by oral route for 90 days.     • famotidine (PEPCID) 20 MG tablet Every 12 (Twelve) Hours.     • finasteride (PROSCAR) 5 MG tablet      • tamsulosin (FLOMAX) 0.4 MG capsule 24 hr capsule Take 1 capsule  by mouth.       No current facility-administered medications on file prior to visit.      No Known Allergies     Review of Systems     Objective      Physical Exam  There were no vitals taken for this visit.    There is no height or weight on file to calculate BMI.    General:   Mental Status:  Alert   Appearance: Cooperative, in no acute distress   Posture: Normal    Assessment and Plan     Diagnoses and all orders for this visit:    1. Primary osteoarthritis of right knee (Primary)  -     Large Joint Arthrocentesis: R knee        1. Primary osteoarthritis of right knee        Procedure Note:  The potential benefits of performing a therapeutic knee joint visco supplementation injection, as well as potential risks (including, but not limited to infection, swelling, pain, bleeding, bruising, nerve/blood vessel damage, and pseudoseptic reaction) have been discussed with the patient.  After informed consent, timeout procedure was performed, and the skin on the right knee was prepped with chlorhexidine soap and alcohol, after which ethyl chloride was applied to the skin at the injection site. Via the anterolateral approach, Orthovisc was injected into the knee joint.  The patient tolerated the procedure well. There were no complications.  Band-Aid was applied to the injection site. Post-procedural instructions discussed with the patient and/or their caregiver.    Return in about 6 months (around 9/16/2022).    Prashanth Avelar MD  03/16/22  10:46 EDT

## 2022-03-28 ENCOUNTER — TRANSCRIBE ORDERS (OUTPATIENT)
Dept: ADMINISTRATIVE | Facility: HOSPITAL | Age: 72
End: 2022-03-28

## 2022-03-28 DIAGNOSIS — M96.1 POSTLAMINECTOMY SYNDROME, CERVICAL REGION: Primary | ICD-10-CM

## 2022-04-11 ENCOUNTER — HOSPITAL ENCOUNTER (OUTPATIENT)
Dept: GENERAL RADIOLOGY | Facility: HOSPITAL | Age: 72
Discharge: HOME | End: 2022-04-11

## 2022-04-11 ENCOUNTER — APPOINTMENT (OUTPATIENT)
Dept: CT IMAGING | Facility: HOSPITAL | Age: 72
End: 2022-04-11

## 2022-04-11 VITALS
TEMPERATURE: 97.6 F | WEIGHT: 228 LBS | HEIGHT: 73 IN | HEART RATE: 80 BPM | SYSTOLIC BLOOD PRESSURE: 172 MMHG | BODY MASS INDEX: 30.22 KG/M2 | DIASTOLIC BLOOD PRESSURE: 83 MMHG | OXYGEN SATURATION: 96 %

## 2022-04-11 DIAGNOSIS — M96.1 POSTLAMINECTOMY SYNDROME, CERVICAL REGION: ICD-10-CM

## 2022-04-11 NOTE — NURSING NOTE
Pt arrived in [facility] today for cervical myelogram. Pt was unaware he needed a . Pt reports his wife has had a major stroke and requires a sitter when he is not there to take care of her as their only son recently passed away. Offered to have pt rescheduled tomorrow if he would be able to make arrangements. Spoke with Rere in radiology and she confirmed they had an opening in the schedule around noon.  Pt went home and is going to call unit back to reschedule if he is able to secure ride and someone to sit with his wife. Pt left unit ambulatory.

## 2022-04-11 NOTE — PRE-PROCEDURE NOTE
Patient was scheduled for a cervical myelogram. Prior to procedure it was realized that no prior imaging was available from outside institution, no pre procedure labs had been ordered. The patient has a spouse with health issues and no  . Attempt to contact referring physician unsuccessful. It was elected to postpone exam until prior exams can be obtained. It may be most appropriate to just repeat CT c spine in work up of patient as opposed to myelogram.

## 2022-04-11 NOTE — NURSING NOTE
Pt contacted [facility] dept. He was able to make arrangements for a ride and for someone to sit with his wife. He will plan on arriving tomorrow to registration by 10:30. Contacted Jyotsna Gutiérrez in radiology to have patient added to schedule for tomorrow.

## 2022-04-12 ENCOUNTER — HOSPITAL ENCOUNTER (OUTPATIENT)
Dept: CT IMAGING | Facility: HOSPITAL | Age: 72
Discharge: HOME | End: 2022-04-12

## 2022-04-12 ENCOUNTER — HOSPITAL ENCOUNTER (OUTPATIENT)
Dept: GENERAL RADIOLOGY | Facility: HOSPITAL | Age: 72
Discharge: HOME | End: 2022-04-12

## 2022-04-12 VITALS
TEMPERATURE: 97.7 F | RESPIRATION RATE: 16 BRPM | HEIGHT: 73 IN | OXYGEN SATURATION: 96 % | DIASTOLIC BLOOD PRESSURE: 96 MMHG | WEIGHT: 227.8 LBS | SYSTOLIC BLOOD PRESSURE: 164 MMHG | BODY MASS INDEX: 30.19 KG/M2 | HEART RATE: 65 BPM

## 2022-04-12 DIAGNOSIS — M96.1 POSTLAMINECTOMY SYNDROME, CERVICAL REGION: ICD-10-CM

## 2022-04-12 PROCEDURE — 25010000002 IOPAMIDOL 61 % SOLUTION: Performed by: ANESTHESIOLOGY

## 2022-04-12 PROCEDURE — 72126 CT NECK SPINE W/DYE: CPT

## 2022-04-12 PROCEDURE — 0 LIDOCAINE 1 % SOLUTION: Performed by: RADIOLOGY

## 2022-04-12 PROCEDURE — 62302 MYELOGRAPHY LUMBAR INJECTION: CPT

## 2022-04-12 PROCEDURE — 72240 MYELOGRAPHY NECK SPINE: CPT

## 2022-04-12 RX ORDER — IBUPROFEN 200 MG
200 TABLET ORAL EVERY 6 HOURS PRN
COMMUNITY

## 2022-04-12 RX ORDER — CARBOXYMETHYLCELLULOSE SODIUM 5 MG/ML
SOLUTION/ DROPS OPHTHALMIC 3 TIMES DAILY PRN
COMMUNITY

## 2022-04-12 RX ORDER — PREGABALIN 75 MG/1
CAPSULE ORAL EVERY 12 HOURS SCHEDULED
COMMUNITY

## 2022-04-12 RX ORDER — LIDOCAINE HYDROCHLORIDE 10 MG/ML
10 INJECTION, SOLUTION INFILTRATION; PERINEURAL ONCE
Status: COMPLETED | OUTPATIENT
Start: 2022-04-12 | End: 2022-04-12

## 2022-04-12 RX ADMIN — LIDOCAINE HYDROCHLORIDE 10 ML: 10 INJECTION, SOLUTION INFILTRATION; PERINEURAL at 12:43

## 2022-04-12 RX ADMIN — IOPAMIDOL 15 ML: 612 INJECTION, SOLUTION INTRATHECAL at 12:42

## 2022-04-12 NOTE — NURSING NOTE
Pt discharged from ir dept s/p myelogram. Pt tolerated procedure without complications. Discharge instructions reviewed with patient who verbalizes understanding. Pt transported to exit via wheelchair per nurse.

## 2022-04-12 NOTE — DISCHARGE INSTRUCTIONS
You will need to rest in a reclined position the remainder of the day today. Avoid any strenuous activities, pulling, tugging, or straining.     You will need to drink plenty of fluids for the next 48 hours to avoid the risk of a spinal headache and to flush the contrast dye out of your system. Include caffeinated beverages especially if you are experiencing a headache.    You may remove the bandaid (if you have one) tomorrow and shower tomorrow; but you will need to avoid tub baths or any situation where your are submersed in water for the next 4 or 5 days to avoid the risk of infection.     You will need to take the cd you were given today with you to your follow up appointment.    DO NOT DRIVE ON THE DAY OF YOUR PROCEDURE.    If you have any problems or concern please contact your physician's office.

## 2022-04-12 NOTE — POST-PROCEDURE NOTE
Radiology Procedure    Pre-procedure: procedure, risks discussed with patient. Patient indicated understanding and consented to procedure.     Procedure Performed: cervical myelogram     IV Sedation and/or Anesthesia:  No    Complications: none    Preliminary Findings: pending    Specimen Removed: none     Estimated Blood Loss:  0ml    Post-Procedure Diagnosis: peding    Post-Procedure Plan: ct C spine, encourage fluids, bed rest x 2 hours      Standard Discharge Instructions Given:yes     TOD Gerber  04/12/22  12:37 EDT

## 2022-04-13 ENCOUNTER — TELEPHONE (OUTPATIENT)
Dept: INFUSION THERAPY | Facility: HOSPITAL | Age: 72
End: 2022-04-13

## 2022-04-22 ENCOUNTER — TRANSCRIBE ORDERS (OUTPATIENT)
Dept: DIABETES SERVICES | Facility: HOSPITAL | Age: 72
End: 2022-04-22

## 2022-04-22 DIAGNOSIS — R73.03 PREDIABETES: Primary | ICD-10-CM

## 2023-10-25 ENCOUNTER — OFFICE VISIT (OUTPATIENT)
Dept: CARDIOLOGY | Facility: CLINIC | Age: 73
End: 2023-10-25
Payer: MEDICARE

## 2023-10-25 VITALS
WEIGHT: 198 LBS | HEIGHT: 73 IN | OXYGEN SATURATION: 97 % | HEART RATE: 98 BPM | SYSTOLIC BLOOD PRESSURE: 130 MMHG | DIASTOLIC BLOOD PRESSURE: 80 MMHG | BODY MASS INDEX: 26.24 KG/M2

## 2023-10-25 DIAGNOSIS — R09.89 BILATERAL CAROTID BRUITS: ICD-10-CM

## 2023-10-25 DIAGNOSIS — R01.1 HEART MURMUR: Primary | ICD-10-CM

## 2023-10-25 PROCEDURE — 99204 OFFICE O/P NEW MOD 45 MIN: CPT | Performed by: INTERNAL MEDICINE

## 2023-10-25 PROCEDURE — 93000 ELECTROCARDIOGRAM COMPLETE: CPT | Performed by: INTERNAL MEDICINE

## 2023-10-25 RX ORDER — HYDROCODONE BITARTRATE AND ACETAMINOPHEN 5; 325 MG/1; MG/1
1 TABLET ORAL 4 TIMES DAILY
COMMUNITY

## 2023-10-25 NOTE — PROGRESS NOTES
"[facility] Cardiology  Consultation H&P  Darryl Mendoza  1950  [phone]  There is no work phone number on file..    VISIT DATE:  10/25/2023    PCP: Megan Weir, APRN  [id number] [place], [place] [id number]    CC:  Chief Complaint   Patient presents with    Heart Murmur       Previous cardiac studies and procedures:  April 2023 CT chest  Atherosclerotic calcification of the aortic arch and coronary arteries.     ASSESSMENT:   Diagnosis Plan   1. Heart murmur  Adult Transthoracic Echo Complete W/ Cont if Necessary Per Protocol      2. Bilateral carotid bruits  Duplex Carotid Ultrasound CAR            PLAN:  Transthoracic echocardiogram pending to assess underlying myocardial structure and function  Bilateral carotid duplex imaging pending.  Goal LDL less than 100, ideally less than 70: Continue atorvastatin 20 mg p.o. daily.  Goal blood pressure less than 130/80.  Currently well controlled, agree with current medical therapy.  Smoking cessation.    History of Present Illness   73-year-old active smoker with history of hypertension dyslipidemia and recently being treated for low-grade follicular lymphoma.  He denies chest pain or limiting dyspnea on exertion.  No palpitations.  Blood pressures running less than 130/80 mmHg.  Currently attempting to quit smoking.  Recent diagnosed with new cardiac murmur.    PHYSICAL EXAMINATION:  Vitals:    10/25/23 0857   BP: 130/80   BP Location: Left arm   Patient Position: Sitting   Pulse: 98   SpO2: 97%   Weight: 89.8 kg (198 lb)   Height: 185.4 cm (73\")     General Appearance:    Alert, cooperative, no distress, appears stated age   Head:    Normocephalic, without obvious abnormality, atraumatic   Eyes:    conjunctiva/corneas clear, EOM's intact, fundi     benign, both eyes   Ears:    Normal TM's and external ear canals, both ears   Nose:   Nares normal, septum midline, mucosa normal, no drainage    or sinus tenderness " "  Throat:   Lips, mucosa, and tongue normal; teeth and gums normal   Neck:   Supple, symmetrical, trachea midline, no adenopathy;     thyroid:  no enlargement/tenderness/nodules; no carotid    bruit or JVD   Back:     Symmetric, no curvature, ROM normal, no CVA tenderness   Lungs:     Clear to auscultation bilaterally, respirations unlabored   Chest Wall:    No tenderness or deformity    Heart:    Regular rate and rhythm, S1 and S2 normal, 2/6 early peaking systolic murmur right upper sternal border with radiation to the base of the neck bilaterally, no rub   or gallop, normal carotid impulse bilaterally with bilateral bruits.   Abdomen:     Soft, non-tender, bowel sounds active all four quadrants,     no masses, no organomegaly   Extremities:   Extremities normal, atraumatic, no cyanosis or edema   Pulses:   2+ and symmetric all extremities   Skin:   Skin color, texture, turgor normal, no rashes or lesions   Lymph nodes:   Cervical, supraclavicular, and axillary nodes normal   Neurologic:   normal strength, sensation intact     throughout       Diagnostic Data:    ECG 12 Lead    Date/Time: 10/25/2023 9:03 AM  Performed by: Silviano Moore III, MD    Authorized by: Silviano Moore III, MD  Previous ECG: no previous ECG available  Rhythm: sinus rhythm  Ectopy: atrial premature contractions  Other findings: non-specific ST-T wave changes    Clinical impression: abnormal EKG        Lab Results   Component Value Date    CHLPL 142 11/26/2019    TRIG 151 (H) 11/26/2019    HDL 37 (L) 11/26/2019     Lab Results   Component Value Date    GLUCOSE 188 (H) 05/09/2023    BUN 11 12/29/2020    CREATININE 0.9 12/29/2020     (L) 05/08/2023    K 4.1 05/09/2023     (H) 05/09/2023    CO2 24 12/29/2020     No results found for: \"HGBA1C\"  Lab Results   Component Value Date    WBC 13.26 (H) 05/11/2023    HGB 12.2 (L) 05/11/2023    HCT 37.1 (L) 05/11/2023     05/11/2023       PROBLEM LIST:  Patient Active Problem List "   Diagnosis    Sprain of medial collateral ligament of right knee    Primary osteoarthritis of right knee       PAST MEDICAL HX  Past Medical History:   Diagnosis Date    Arthritis     knee    Dupuytren's contracture     GERD (gastroesophageal reflux disease)     Hypertension     Pain, ankle     Smoker        Allergies  No Known Allergies    Current Medications    Current Outpatient Medications:     acetaminophen (TYLENOL) 500 MG tablet, Take 1 tablet by mouth Take As Directed., Disp: , Rfl:     amLODIPine-Valsartan-HCTZ (Exforge HCT) 5-160-12.5 MG tablet, Exforge HCT 5 mg-160 mg-12.5 mg tablet  Take 1 tablet every day by oral route for 90 days., Disp: , Rfl:     atorvastatin (LIPITOR) 20 MG tablet, atorvastatin 20 mg tablet  Take 1 tablet every day by oral route for 90 days., Disp: , Rfl:     famotidine (PEPCID) 20 MG tablet, 0.5 tablets 2 (Two) Times a Day As Needed., Disp: , Rfl:     finasteride (PROSCAR) 5 MG tablet, , Disp: , Rfl:     HYDROcodone-acetaminophen (NORCO) 5-325 MG per tablet, Take 1 tablet by mouth 4 (Four) Times a Day., Disp: , Rfl:     ibuprofen (ADVIL,MOTRIN) 200 MG tablet, Take 1 tablet by mouth Every 6 (Six) Hours As Needed for Mild Pain., Disp: , Rfl:     tamsulosin (FLOMAX) 0.4 MG capsule 24 hr capsule, Take 1 capsule by mouth., Disp: , Rfl:     acetaminophen (TYLENOL) 325 MG tablet, Take 1 tablet by mouth., Disp: , Rfl:     carboxymethylcellulose (REFRESH PLUS) 0.5 % solution, 3 (Three) Times a Day As Needed for Dry Eyes., Disp: , Rfl:     pregabalin (Lyrica) 75 MG capsule, Every 12 (Twelve) Hours., Disp: , Rfl:          ROS  ROS      SOCIAL HX  Social History     Socioeconomic History    Marital status:    Tobacco Use    Smoking status: Every Day     Packs/day: .25     Types: Cigarettes    Smokeless tobacco: Never    Tobacco comments:     4-5 cigarettes daily    Substance and Sexual Activity    Alcohol use: Not Currently    Drug use: No    Sexual activity: Defer       FAMILY  HX  Family History   Problem Relation Age of Onset    Stroke Mother     Hypertension Mother     Osteoarthritis Mother     Cancer Father     Stroke Other     Heart disease Other     Hypertension Other     Heart attack Other     Osteoarthritis Other              Silviano Moore III, MD, FACC

## 2023-12-18 ENCOUNTER — HOSPITAL ENCOUNTER (OUTPATIENT)
Dept: CARDIOLOGY | Facility: HOSPITAL | Age: 73
Discharge: HOME | End: 2023-12-18
Payer: MEDICARE

## 2023-12-18 VITALS — HEIGHT: 73 IN | BODY MASS INDEX: 26.24 KG/M2 | WEIGHT: 198 LBS

## 2023-12-18 DIAGNOSIS — R01.1 HEART MURMUR: ICD-10-CM

## 2023-12-18 DIAGNOSIS — R09.89 BILATERAL CAROTID BRUITS: ICD-10-CM

## 2023-12-18 LAB
BH CV ECHO MEAS - AO MAX PG: 12.5 MMHG
BH CV ECHO MEAS - AO MEAN PG: 6.5 MMHG
BH CV ECHO MEAS - AO ROOT DIAM: 3.5 CM
BH CV ECHO MEAS - AO V2 MAX: 177 CM/SEC
BH CV ECHO MEAS - AO V2 VTI: 39.9 CM
BH CV ECHO MEAS - AVA(I,D): 1.95 CM2
BH CV ECHO MEAS - EDV(CUBED): 144.7 ML
BH CV ECHO MEAS - EDV(MOD-SP2): 216 ML
BH CV ECHO MEAS - EDV(MOD-SP4): 215 ML
BH CV ECHO MEAS - EF(MOD-BP): 65 %
BH CV ECHO MEAS - EF(MOD-SP2): 68.5 %
BH CV ECHO MEAS - EF(MOD-SP4): 65.1 %
BH CV ECHO MEAS - ESV(CUBED): 41.8 ML
BH CV ECHO MEAS - ESV(MOD-SP2): 68 ML
BH CV ECHO MEAS - ESV(MOD-SP4): 75 ML
BH CV ECHO MEAS - FS: 33.9 %
BH CV ECHO MEAS - IVS/LVPW: 0.81 CM
BH CV ECHO MEAS - IVSD: 0.86 CM
BH CV ECHO MEAS - LA DIMENSION: 4.3 CM
BH CV ECHO MEAS - LAT PEAK E' VEL: 8.2 CM/SEC
BH CV ECHO MEAS - LV DIASTOLIC VOL/BSA (35-75): 100.3 CM2
BH CV ECHO MEAS - LV MASS(C)D: 185 GRAMS
BH CV ECHO MEAS - LV MAX PG: 3.1 MMHG
BH CV ECHO MEAS - LV MEAN PG: 1 MMHG
BH CV ECHO MEAS - LV SYSTOLIC VOL/BSA (12-30): 35 CM2
BH CV ECHO MEAS - LV V1 MAX: 87.4 CM/SEC
BH CV ECHO MEAS - LV V1 VTI: 20.4 CM
BH CV ECHO MEAS - LVIDD: 5.3 CM
BH CV ECHO MEAS - LVIDS: 3.5 CM
BH CV ECHO MEAS - LVOT AREA: 3.8 CM2
BH CV ECHO MEAS - LVOT DIAM: 2.2 CM
BH CV ECHO MEAS - LVPWD: 1.05 CM
BH CV ECHO MEAS - MED PEAK E' VEL: 6.25 CM/SEC
BH CV ECHO MEAS - MV A MAX VEL: 149 CM/SEC
BH CV ECHO MEAS - MV DEC SLOPE: 396 CM/SEC2
BH CV ECHO MEAS - MV DEC TIME: 0.37 SEC
BH CV ECHO MEAS - MV E MAX VEL: 140 CM/SEC
BH CV ECHO MEAS - MV E/A: 0.94
BH CV ECHO MEAS - MV MAX PG: 11.3 MMHG
BH CV ECHO MEAS - MV MEAN PG: 6 MMHG
BH CV ECHO MEAS - MV P1/2T: 118.3 MSEC
BH CV ECHO MEAS - MV V2 VTI: 63.4 CM
BH CV ECHO MEAS - MVA(P1/2T): 1.86 CM2
BH CV ECHO MEAS - MVA(VTI): 1.22 CM2
BH CV ECHO MEAS - PA ACC SLOPE: 832 CM/SEC2
BH CV ECHO MEAS - PA ACC TIME: 0.13 SEC
BH CV ECHO MEAS - PA V2 MAX: 114 CM/SEC
BH CV ECHO MEAS - PAPD(PI EDV): 3 MMHG
BH CV ECHO MEAS - PI END-D VEL: 79.3 CM/SEC
BH CV ECHO MEAS - RF(MV,LVOT)(1DIAM): 0.88 CM
BH CV ECHO MEAS - RVSP: 8 MMHG
BH CV ECHO MEAS - SI(MOD-SP2): 69.1 ML/M2
BH CV ECHO MEAS - SI(MOD-SP4): 65.3 ML/M2
BH CV ECHO MEAS - SV(LVOT): 77.5 ML
BH CV ECHO MEAS - SV(MOD-SP2): 148 ML
BH CV ECHO MEAS - SV(MOD-SP4): 140 ML
BH CV ECHO MEAS - TAPSE (>1.6): 3 CM
BH CV ECHO MEASUREMENTS AVERAGE E/E' RATIO: 19.38
BH CV VAS BP LEFT ARM: NORMAL MMHG
BH CV XLRA - RV BASE: 5 CM
BH CV XLRA - RV LENGTH: 8.4 CM
BH CV XLRA - RV MID: 3.8 CM
BH CV XLRA - TDI S': 17.1 CM/SEC
BH CV XLRA MEAS LEFT DIST CCA EDV: 14.9 CM/SEC
BH CV XLRA MEAS LEFT DIST CCA PSV: 119 CM/SEC
BH CV XLRA MEAS LEFT DIST ICA EDV: 24.8 CM/SEC
BH CV XLRA MEAS LEFT DIST ICA PSV: 108 CM/SEC
BH CV XLRA MEAS LEFT ICA/CCA RATIO: 1.82
BH CV XLRA MEAS LEFT MID CCA EDV: 13.8 CM/SEC
BH CV XLRA MEAS LEFT MID CCA PSV: 113 CM/SEC
BH CV XLRA MEAS LEFT MID ICA EDV: 24.7 CM/SEC
BH CV XLRA MEAS LEFT MID ICA PSV: 198 CM/SEC
BH CV XLRA MEAS LEFT PROX CCA EDV: 16.7 CM/SEC
BH CV XLRA MEAS LEFT PROX CCA PSV: 156 CM/SEC
BH CV XLRA MEAS LEFT PROX ECA EDV: 15.7 CM/SEC
BH CV XLRA MEAS LEFT PROX ECA PSV: 361 CM/SEC
BH CV XLRA MEAS LEFT PROX ICA EDV: 45.2 CM/SEC
BH CV XLRA MEAS LEFT PROX ICA PSV: 217 CM/SEC
BH CV XLRA MEAS LEFT PROX SCLA PSV: 519 CM/SEC
BH CV XLRA MEAS LEFT VERTEBRAL A PSV: 43.2 CM/SEC
BH CV XLRA MEAS RIGHT DIST CCA EDV: 17.1 CM/SEC
BH CV XLRA MEAS RIGHT DIST CCA PSV: 106 CM/SEC
BH CV XLRA MEAS RIGHT DIST ICA EDV: 28.3 CM/SEC
BH CV XLRA MEAS RIGHT DIST ICA PSV: 115 CM/SEC
BH CV XLRA MEAS RIGHT ICA/CCA RATIO: 2.33
BH CV XLRA MEAS RIGHT MID CCA EDV: 14.9 CM/SEC
BH CV XLRA MEAS RIGHT MID CCA PSV: 99.3 CM/SEC
BH CV XLRA MEAS RIGHT MID ICA EDV: 37.3 CM/SEC
BH CV XLRA MEAS RIGHT MID ICA PSV: 167 CM/SEC
BH CV XLRA MEAS RIGHT PROX CCA EDV: 14.7 CM/SEC
BH CV XLRA MEAS RIGHT PROX CCA PSV: 153 CM/SEC
BH CV XLRA MEAS RIGHT PROX ECA EDV: 13.8 CM/SEC
BH CV XLRA MEAS RIGHT PROX ECA PSV: 292 CM/SEC
BH CV XLRA MEAS RIGHT PROX ICA EDV: 44.9 CM/SEC
BH CV XLRA MEAS RIGHT PROX ICA PSV: 227 CM/SEC
BH CV XLRA MEAS RIGHT PROX SCLA PSV: 320 CM/SEC
BH CV XLRA MEAS RIGHT VERTEBRAL A EDV: 14 CM/SEC
BH CV XLRA MEAS RIGHT VERTEBRAL A PSV: 63.3 CM/SEC
IVRT: 74 MS
LEFT ARM BP: NORMAL MMHG
LEFT ATRIUM VOLUME INDEX: 45.3 ML/M2
LV EF 2D ECHO EST: 65 %
RIGHT ARM BP: NORMAL MMHG

## 2023-12-18 PROCEDURE — 93306 TTE W/DOPPLER COMPLETE: CPT

## 2023-12-18 PROCEDURE — 93880 EXTRACRANIAL BILAT STUDY: CPT

## 2024-01-29 ENCOUNTER — OFFICE VISIT (OUTPATIENT)
Dept: CARDIOLOGY | Facility: CLINIC | Age: 74
End: 2024-01-29
Payer: MEDICARE

## 2024-01-29 VITALS
HEIGHT: 73 IN | SYSTOLIC BLOOD PRESSURE: 184 MMHG | OXYGEN SATURATION: 98 % | HEART RATE: 71 BPM | BODY MASS INDEX: 27.57 KG/M2 | DIASTOLIC BLOOD PRESSURE: 90 MMHG | WEIGHT: 208 LBS

## 2024-01-29 DIAGNOSIS — I10 PRIMARY HYPERTENSION: Primary | ICD-10-CM

## 2024-01-29 DIAGNOSIS — E78.2 MIXED HYPERLIPIDEMIA: ICD-10-CM

## 2024-01-29 DIAGNOSIS — I65.23 BILATERAL CAROTID ARTERY STENOSIS: ICD-10-CM

## 2024-01-29 PROCEDURE — 3077F SYST BP >= 140 MM HG: CPT | Performed by: INTERNAL MEDICINE

## 2024-01-29 PROCEDURE — 99214 OFFICE O/P EST MOD 30 MIN: CPT | Performed by: INTERNAL MEDICINE

## 2024-01-29 PROCEDURE — 3080F DIAST BP >= 90 MM HG: CPT | Performed by: INTERNAL MEDICINE

## 2024-01-29 RX ORDER — CYANOCOBALAMIN 1000 UG/ML
1000 INJECTION, SOLUTION INTRAMUSCULAR; SUBCUTANEOUS
COMMUNITY
Start: 2023-11-28

## 2024-01-29 RX ORDER — VALSARTAN 160 MG/1
160 TABLET ORAL DAILY
Qty: 90 TABLET | Refills: 3 | Status: SHIPPED | OUTPATIENT
Start: 2024-01-29 | End: 2024-01-29 | Stop reason: SDUPTHER

## 2024-01-29 RX ORDER — VALSARTAN 160 MG/1
160 TABLET ORAL DAILY
Qty: 90 TABLET | Refills: 3 | Status: SHIPPED | OUTPATIENT
Start: 2024-01-29

## 2024-01-29 NOTE — PROGRESS NOTES
[facility] Cardiology  Office visit  Darryl Mendoza  1950  [phone]  There is no work phone number on file.    VISIT DATE:  1/29/2024    PCP: Megan Weir, APRN  [address] [place] [id number]    CC:  Chief Complaint   Patient presents with    Heart Murmur       Previous cardiac studies and procedures:  April 2023 CT chest  Atherosclerotic calcification of the aortic arch and coronary arteries.     December 2023  Bilateral carotid duplex    Right internal carotid artery demonstrates a 50-69% stenosis.    Left internal carotid artery demonstrates a 50-69% stenosis.    Left Subclavian: Imaging indicates stenosis.  TTE    Left ventricular systolic function is normal. Calculated left ventricular EF = 65% Left ventricular ejection fraction appears to be 61 - 65%.    Left ventricular diastolic function is consistent with (grade Ia w/high LAP) impaired relaxation.    The left atrial cavity is mildly dilated.    There is mild calcification of the aortic valve mainly affecting the non-coronary and right coronary cusp(s).    Mild mitral valve stenosis is present.    ASSESSMENT:   Diagnosis Plan   1. Primary hypertension        2. Mixed hyperlipidemia        3. Bilateral carotid artery stenosis            PLAN:  Hypertension: Goal less than 130/80 mmHg.  Continue Exforge HCT 5-1 60-12.5 mg p.o. daily.  Adding valsartan 160 mg p.o. nightly.    Hyperlipidemia: Goal LDL less than 70.  He will be more consistent with taking atorvastatin 20 mg p.o. nightly, repeat lipid profile in 3 to 6 months.    Bilateral carotid stenosis, moderate: Currently asymptomatic.  Continue current medical therapy.  Carotid duplex imaging every 1 to 2 years.    Subjective  Interval assessment: Denies chest pain, palpitations or dyspnea.  Home blood pressures running in the 140/90 mmHg range.  He is compliant with medical therapy.    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  He denies chest pain or limiting dyspnea on exertion.  No palpitations.  Blood pressures running less than 130/80 mmHg.  Currently attempting to quit smoking.  Recent diagnosed with new cardiac murmur.     PHYSICAL EXAMINATION:  Vitals:    01/29/24 1413   BP: (!) 184/90   BP Location: Left arm   Patient Position: Sitting   Cuff Size: Adult   Pulse: 71   SpO2: 98%   Weight: 94.3 kg (208 lb)   Height: 185.4 cm (73\")     General Appearance:    Alert, cooperative, no distress, appears stated age   Head:    Normocephalic, without obvious abnormality, atraumatic   Eyes:    conjunctiva/corneas clear   Nose:   Nares normal, septum midline, mucosa normal, no drainage   Throat:   Lips, teeth and gums normal   Neck:   Supple, symmetrical, trachea midline, no carotid    bruit or JVD   Lungs:     Clear to auscultation bilaterally, respirations unlabored   Chest Wall:    No tenderness or deformity    Heart:    Regular rate and rhythm, S1 and S2 normal, no murmur, rub   or gallop, normal carotid impulse bilaterally without bruit.   Abdomen:     Soft, non-tender   Extremities:   Extremities normal, atraumatic, no cyanosis or edema   Pulses:   2+ and symmetric all extremities   Skin:   Skin color, texture, turgor normal, no rashes or lesions       Diagnostic Data:  Procedures  Lab Results   Component Value Date    CHLPL 142 11/26/2019    TRIG 151 (H) 11/26/2019    HDL 37 (L) 11/26/2019     Lab Results   Component Value Date    GLUCOSE 188 (H) 05/09/2023    BUN 11 12/29/2020    CREATININE 0.9 12/29/2020     (L) 05/08/2023    K 4.1 05/09/2023     (H) 05/09/2023    CO2 24 12/29/2020     No results found for: \"HGBA1C\"  Lab Results   Component Value Date    WBC 13.26 (H) 05/11/2023    HGB 12.2 (L) 05/11/2023    HCT 37.1 (L) 05/11/2023     05/11/2023       Allergies  No Known Allergies    Current Medications    Current " Outpatient Medications:     acetaminophen (TYLENOL) 325 MG tablet, Take 1 tablet by mouth., Disp: , Rfl:     amLODIPine-Valsartan-HCTZ (Exforge HCT) 5-160-12.5 MG tablet, Exforge HCT 5 mg-160 mg-12.5 mg tablet  Take 1 tablet every day by oral route for 90 days., Disp: , Rfl:     atorvastatin (LIPITOR) 20 MG tablet, atorvastatin 20 mg tablet  Take 1 tablet every day by oral route for 90 days., Disp: , Rfl:     carboxymethylcellulose (REFRESH PLUS) 0.5 % solution, 3 (Three) Times a Day As Needed for Dry Eyes., Disp: , Rfl:     cyanocobalamin 1000 MCG/ML injection, 1 mL Every 30 (Thirty) Days., Disp: , Rfl:     famotidine (PEPCID) 20 MG tablet, 0.5 tablets 2 (Two) Times a Day As Needed., Disp: , Rfl:     finasteride (PROSCAR) 5 MG tablet, As Needed. Pt not taking daily, Disp: , Rfl:     HYDROcodone-acetaminophen (NORCO) 5-325 MG per tablet, Take 1 tablet by mouth 4 (Four) Times a Day., Disp: , Rfl:     ibuprofen (ADVIL,MOTRIN) 200 MG tablet, Take 1 tablet by mouth Every 6 (Six) Hours As Needed for Mild Pain., Disp: , Rfl:     tamsulosin (FLOMAX) 0.4 MG capsule 24 hr capsule, Take 1 capsule by mouth As Needed. Pt not taking daily, Disp: , Rfl:           ROS  ROS      SOCIAL HX  Social History     Socioeconomic History    Marital status:    Tobacco Use    Smoking status: Every Day     Packs/day: .25     Types: Cigarettes    Smokeless tobacco: Never    Tobacco comments:     4-5 cigarettes daily    Vaping Use    Vaping Use: Never used   Substance and Sexual Activity    Alcohol use: Not Currently    Drug use: No    Sexual activity: Defer       FAMILY HX  Family History   Problem Relation Age of Onset    Stroke Mother     Hypertension Mother     Osteoarthritis Mother     Cancer Father     Stroke Other     Heart disease Other     Hypertension Other     Heart attack Other     Osteoarthritis Other              Silviano Moore III, MD, [facility]

## 2024-04-23 ENCOUNTER — TRANSCRIBE ORDERS (OUTPATIENT)
Dept: ADMINISTRATIVE | Facility: HOSPITAL | Age: 74
End: 2024-04-23
Payer: MEDICARE

## 2024-04-23 DIAGNOSIS — M96.1 POST LAMINECTOMY SYNDROME: ICD-10-CM

## 2024-04-23 DIAGNOSIS — M96.1 POSTLAMINECTOMY SYNDROME, NOT ELSEWHERE CLASSIFIED: ICD-10-CM

## 2024-05-14 ENCOUNTER — TELEPHONE (OUTPATIENT)
Dept: INFUSION THERAPY | Facility: HOSPITAL | Age: 74
End: 2024-05-14
Payer: MEDICARE

## 2024-05-14 NOTE — TELEPHONE ENCOUNTER
Pre procedure call. Reviewed medications, history, arrival time and need for  and to go to registration. All questions answered. Unit phone number provided.

## 2024-05-15 ENCOUNTER — HOSPITAL ENCOUNTER (OUTPATIENT)
Dept: CT IMAGING | Facility: HOSPITAL | Age: 74
Discharge: HOME | End: 2024-05-15
Payer: MEDICARE

## 2024-05-15 ENCOUNTER — HOSPITAL ENCOUNTER (OUTPATIENT)
Dept: GENERAL RADIOLOGY | Facility: HOSPITAL | Age: 74
Discharge: HOME | End: 2024-05-15
Payer: MEDICARE

## 2024-05-15 VITALS
BODY MASS INDEX: 27.38 KG/M2 | OXYGEN SATURATION: 97 % | HEART RATE: 70 BPM | TEMPERATURE: 97.1 F | RESPIRATION RATE: 18 BRPM | DIASTOLIC BLOOD PRESSURE: 97 MMHG | SYSTOLIC BLOOD PRESSURE: 149 MMHG | HEIGHT: 73 IN | WEIGHT: 206.6 LBS

## 2024-05-15 DIAGNOSIS — M96.1 POST LAMINECTOMY SYNDROME: ICD-10-CM

## 2024-05-15 DIAGNOSIS — M96.1 POSTLAMINECTOMY SYNDROME, NOT ELSEWHERE CLASSIFIED: ICD-10-CM

## 2024-05-15 PROCEDURE — 25510000001 IOPAMIDOL 61 % SOLUTION: Performed by: PHYSICIAN ASSISTANT

## 2024-05-15 PROCEDURE — 62302 MYELOGRAPHY LUMBAR INJECTION: CPT

## 2024-05-15 PROCEDURE — 25010000002 LIDOCAINE 1 % SOLUTION: Performed by: PHYSICIAN ASSISTANT

## 2024-05-15 PROCEDURE — 72240 MYELOGRAPHY NECK SPINE: CPT

## 2024-05-15 PROCEDURE — 72126 CT NECK SPINE W/DYE: CPT

## 2024-05-15 RX ORDER — IOPAMIDOL 612 MG/ML
15 INJECTION, SOLUTION INTRATHECAL
Status: COMPLETED | OUTPATIENT
Start: 2024-05-15 | End: 2024-05-15

## 2024-05-15 RX ORDER — LIDOCAINE HYDROCHLORIDE 10 MG/ML
5 INJECTION, SOLUTION INFILTRATION; PERINEURAL ONCE
Status: COMPLETED | OUTPATIENT
Start: 2024-05-15 | End: 2024-05-15

## 2024-05-15 RX ADMIN — LIDOCAINE HYDROCHLORIDE 5 ML: 10 INJECTION, SOLUTION INFILTRATION; PERINEURAL at 09:35

## 2024-05-15 RX ADMIN — IOPAMIDOL 15 ML: 612 INJECTION, SOLUTION INTRATHECAL at 09:35

## 2024-05-15 NOTE — POST-PROCEDURE NOTE
Radiology Procedure    Pre-procedure: procedure, risks discussed with patient. Patient indicate understanding and consented to procedure.     Procedure Performed: cervical myelogram     IV Sedation and/or Anesthesia:  No    Complications: none    Preliminary Findings: pending    Specimen Removed: none    Estimated Blood Loss:  0ml    Post-Procedure Diagnosis: pending    Post-Procedure Plan: CT Cspine encourage fluids, bed rest x 2 hours    Standard Discharge Instructions Given:yes     TOD Gerber  05/15/24  11:24 EDT

## 2024-05-15 NOTE — DISCHARGE INSTRUCTIONS
You will need to rest in a reclined position the remainder of the day today. Avoid any strenuous activities, pulling, tugging, or straining for 48 hours.     You will need to drink plenty of fluids for the next 48 hours to avoid the risk of a spinal headache and to flush the contrast dye out of your system. Include caffeinated beverages especially if you are experiencing a headache.    You may remove the band aid tomorrow and shower; but you will need to avoid tub baths or any situation where your are submersed in water for the next 4 or 5 days to avoid the risk of infection.     You will need to take the cd you were given today with you to your follow up appointment.    DO NOT DRIVE ON THE DAY OF YOUR PROCEDURE.    If you have any problems or concern please contact your physician's office.

## 2024-05-16 ENCOUNTER — TELEPHONE (OUTPATIENT)
Dept: INFUSION THERAPY | Facility: HOSPITAL | Age: 74
End: 2024-05-16
Payer: MEDICARE

## 2024-09-18 ENCOUNTER — OFFICE VISIT (OUTPATIENT)
Dept: CARDIOLOGY | Facility: CLINIC | Age: 74
End: 2024-09-18
Payer: MEDICARE

## 2024-09-18 VITALS
DIASTOLIC BLOOD PRESSURE: 80 MMHG | BODY MASS INDEX: 27.65 KG/M2 | HEART RATE: 74 BPM | OXYGEN SATURATION: 98 % | WEIGHT: 208.6 LBS | SYSTOLIC BLOOD PRESSURE: 162 MMHG | HEIGHT: 73 IN

## 2024-09-18 DIAGNOSIS — I10 PRIMARY HYPERTENSION: ICD-10-CM

## 2024-09-18 DIAGNOSIS — I65.23 BILATERAL CAROTID ARTERY STENOSIS: Primary | ICD-10-CM

## 2024-09-18 DIAGNOSIS — E78.2 MIXED HYPERLIPIDEMIA: ICD-10-CM

## 2024-09-18 PROCEDURE — 3077F SYST BP >= 140 MM HG: CPT | Performed by: INTERNAL MEDICINE

## 2024-09-18 PROCEDURE — G2211 COMPLEX E/M VISIT ADD ON: HCPCS | Performed by: INTERNAL MEDICINE

## 2024-09-18 PROCEDURE — 3079F DIAST BP 80-89 MM HG: CPT | Performed by: INTERNAL MEDICINE

## 2024-09-18 PROCEDURE — 99214 OFFICE O/P EST MOD 30 MIN: CPT | Performed by: INTERNAL MEDICINE

## 2024-10-16 ENCOUNTER — TELEPHONE (OUTPATIENT)
Dept: CARDIOLOGY | Facility: CLINIC | Age: 74
End: 2024-10-16
Payer: MEDICARE

## 2024-10-16 RX ORDER — AMLODIPINE BESYLATE VALSARTAN HYDROCHLOROTHIAZIDE 10; 25; 320 MG/1; MG/1; MG/1
1 TABLET, FILM COATED ORAL DAILY
Qty: 90 TABLET | Refills: 1 | Status: SHIPPED | OUTPATIENT
Start: 2024-10-16

## 2024-10-16 NOTE — TELEPHONE ENCOUNTER
Reports doing well since doubling up on his Exforge. B/P's 129/69,132/70,135/61. The highest was 142/68. Please advise.

## 2025-03-10 RX ORDER — AMLODIPINE,VALSARTAN AND HYDROCHLOROTHIAZIDE 10; 25; 320 MG/1; MG/1; MG/1
TABLET, FILM COATED ORAL
Qty: 90 TABLET | Refills: 0 | Status: SHIPPED | OUTPATIENT
Start: 2025-03-10

## 2025-05-05 ENCOUNTER — OFFICE VISIT (OUTPATIENT)
Dept: CARDIOLOGY | Facility: CLINIC | Age: 75
End: 2025-05-05
Payer: MEDICARE

## 2025-05-05 VITALS
SYSTOLIC BLOOD PRESSURE: 154 MMHG | HEIGHT: 73 IN | OXYGEN SATURATION: 97 % | BODY MASS INDEX: 27.43 KG/M2 | HEART RATE: 58 BPM | WEIGHT: 207 LBS | DIASTOLIC BLOOD PRESSURE: 70 MMHG

## 2025-05-05 DIAGNOSIS — I65.23 BILATERAL CAROTID ARTERY STENOSIS: Primary | ICD-10-CM

## 2025-05-05 DIAGNOSIS — E78.2 MIXED HYPERLIPIDEMIA: ICD-10-CM

## 2025-05-05 DIAGNOSIS — I10 PRIMARY HYPERTENSION: ICD-10-CM

## 2025-05-05 PROCEDURE — 3077F SYST BP >= 140 MM HG: CPT | Performed by: INTERNAL MEDICINE

## 2025-05-05 PROCEDURE — 3078F DIAST BP <80 MM HG: CPT | Performed by: INTERNAL MEDICINE

## 2025-05-05 PROCEDURE — G2211 COMPLEX E/M VISIT ADD ON: HCPCS | Performed by: INTERNAL MEDICINE

## 2025-05-05 PROCEDURE — 99214 OFFICE O/P EST MOD 30 MIN: CPT | Performed by: INTERNAL MEDICINE

## 2025-05-05 NOTE — PROGRESS NOTES
[facility] Cardiology  Office visit  Darryl Mendoza  1950  [phone]  There is no work phone number on file.    VISIT DATE:  5/5/2025    PCP: Megan Weir, APRN  [address] [place] [id number]    CC:  Chief Complaint   Patient presents with    Bilateral carotid artery stenosis       Previous cardiac studies and procedures:  April 2023 CT chest  Atherosclerotic calcification of the aortic arch and coronary arteries.     December 2023  Bilateral carotid duplex    Right internal carotid artery demonstrates a 50-69% stenosis.    Left internal carotid artery demonstrates a 50-69% stenosis.    Left Subclavian: Imaging indicates stenosis.  TTE    Left ventricular systolic function is normal. Calculated left ventricular EF = 65% Left ventricular ejection fraction appears to be 61 - 65%.    Left ventricular diastolic function is consistent with (grade Ia w/high LAP) impaired relaxation.    The left atrial cavity is mildly dilated.    There is mild calcification of the aortic valve mainly affecting the non-coronary and right coronary cusp(s).    Mild mitral valve stenosis is present.    ASSESSMENT:   Diagnosis Plan   1. Bilateral carotid artery stenosis        2. Mixed hyperlipidemia        3. Primary hypertension            PLAN:  Hypertension: Goal less than 130/80 mmHg.  Continue current medical therapy.  Recent creatinine increased to 1.4.  He was instructed avoid dehydration and stop using NSAIDs.  Repeat renal function pending.    Hyperlipidemia: Goal LDL less than 70.  Continue atorvastatin 20 mg p.o. nightly.    Bilateral carotid stenosis, moderate: Currently asymptomatic.  Continue current medical therapy.  Carotid duplex imaging pending.    Subjective  Interval assessment: Denies chest pain, palpitations or dyspnea.  Home blood pressures running less than 135/80 mmHg.  He is compliant with medical therapy.    Initial evaluation: 73-year-old active  "smoker with history of hypertension dyslipidemia and recently being treated for low-grade follicular lymphoma.  He denies chest pain or limiting dyspnea on exertion.  No palpitations.  Blood pressures running less than 130/80 mmHg.  Currently attempting to quit smoking.  Recent diagnosed with new cardiac murmur.     PHYSICAL EXAMINATION:  Vitals:    05/05/25 1510   BP: 154/70   BP Location: Right arm   Patient Position: Sitting   Pulse: 58   SpO2: 97%   Weight: 93.9 kg (207 lb)   Height: 185.4 cm (73\")       General Appearance:    Alert, cooperative, no distress, appears stated age   Head:    Normocephalic, without obvious abnormality, atraumatic   Eyes:    conjunctiva/corneas clear   Nose:   Nares normal, septum midline, mucosa normal, no drainage   Throat:   Lips, teeth and gums normal   Neck:   Supple, symmetrical, trachea midline, no carotid    bruit or JVD   Lungs:     Clear to auscultation bilaterally, respirations unlabored   Chest Wall:    No tenderness or deformity    Heart:  Intermittent ectopy, S1 and S2 normal, no murmur, rub   or gallop, normal carotid impulse bilaterally without bruit.   Abdomen:     Soft, non-tender   Extremities:   Extremities normal, atraumatic, no cyanosis or edema   Pulses:   2+ and symmetric all extremities   Skin:   Skin color, texture, turgor normal, no rashes or lesions       Diagnostic Data:  Procedures  Lab Results   Component Value Date    CHLPL 142 11/26/2019    TRIG 151 (H) 11/26/2019    HDL 37 (L) 11/26/2019     Lab Results   Component Value Date    GLUCOSE 188 (H) 05/09/2023    BUN 11 12/29/2020    CREATININE 0.9 12/29/2020     (L) 05/08/2023    K 4.1 05/09/2023     (H) 05/09/2023    CO2 24 12/29/2020     No results found for: \"HGBA1C\"  Lab Results   Component Value Date    WBC 13.26 (H) 05/11/2023    HGB 12.2 (L) 05/11/2023    HCT 37.1 (L) 05/11/2023     05/11/2023       Allergies  No Known Allergies    Current Medications    Current Outpatient " Medications:     Acetaminophen 500 MG capsule, Take  by mouth., Disp: , Rfl:     amLODIPine-Valsartan-HCTZ -25 MG tablet, TAKE 1 TABLET DAILY (DOSE  ADJUSTMENT), Disp: 90 tablet, Rfl: 0    atorvastatin (LIPITOR) 20 MG tablet, atorvastatin 20 mg tablet  Take 1 tablet every day by oral route for 90 days., Disp: , Rfl:     cyanocobalamin 1000 MCG/ML injection, 1 mL Every 30 (Thirty) Days., Disp: , Rfl:     famotidine (PEPCID) 20 MG tablet, 0.5 tablets 2 (Two) Times a Day As Needed., Disp: , Rfl:     finasteride (PROSCAR) 5 MG tablet, As Needed. Pt not taking daily, Disp: , Rfl:     HYDROcodone-acetaminophen (NORCO) 5-325 MG per tablet, Take 1 tablet by mouth 4 (Four) Times a Day., Disp: , Rfl:     ibuprofen (ADVIL,MOTRIN) 200 MG tablet, Take 1 tablet by mouth Every 6 (Six) Hours As Needed for Mild Pain., Disp: , Rfl:     tamsulosin (FLOMAX) 0.4 MG capsule 24 hr capsule, Take 1 capsule by mouth As Needed. Pt not taking daily, Disp: , Rfl:     carboxymethylcellulose (REFRESH PLUS) 0.5 % solution, 3 (Three) Times a Day As Needed for Dry Eyes. (Patient not taking: Reported on 9/18/2024), Disp: , Rfl:           ROS  ROS      SOCIAL HX  Social History     Socioeconomic History    Marital status:    Tobacco Use    Smoking status: Some Days     Current packs/day: 0.25     Average packs/day: 0.3 packs/day for 30.5 years (8.4 ttl pk-yrs)     Types: Cigarettes     Start date: 1/1/2000    Smokeless tobacco: Never   Vaping Use    Vaping status: Never Used   Substance and Sexual Activity    Alcohol use: Never    Drug use: Never    Sexual activity: Not Currently     Partners: Female       FAMILY HX  Family History   Problem Relation Age of Onset    Stroke Mother     Hypertension Mother     Osteoarthritis Mother     Hyperlipidemia Mother     Cancer Father     Stroke Other     Heart disease Other     Hypertension Other     Heart attack Other     Osteoarthritis Other              Silviano Moore III, MD, FACC

## 2025-05-22 RX ORDER — AMLODIPINE,VALSARTAN AND HYDROCHLOROTHIAZIDE 10; 25; 320 MG/1; MG/1; MG/1
TABLET, FILM COATED ORAL
Qty: 90 TABLET | Refills: 1 | Status: SHIPPED | OUTPATIENT
Start: 2025-05-22

## 2025-07-30 ENCOUNTER — OFFICE VISIT (OUTPATIENT)
Dept: ORTHOPEDIC SURGERY | Facility: CLINIC | Age: 75
End: 2025-07-30
Payer: MEDICARE

## 2025-07-30 VITALS
WEIGHT: 201 LBS | HEIGHT: 72 IN | BODY MASS INDEX: 27.22 KG/M2 | DIASTOLIC BLOOD PRESSURE: 66 MMHG | SYSTOLIC BLOOD PRESSURE: 136 MMHG

## 2025-07-30 DIAGNOSIS — M17.0 PRIMARY OSTEOARTHRITIS OF BOTH KNEES: Primary | ICD-10-CM

## 2025-07-30 RX ORDER — HYDROCORTISONE 10 MG/1
TABLET ORAL
COMMUNITY
Start: 2025-06-18

## 2025-07-30 RX ORDER — CYCLOBENZAPRINE HYDROCHLORIDE 7.5 MG/1
7.5 TABLET, FILM COATED ORAL 3 TIMES DAILY
COMMUNITY
Start: 2025-03-12

## 2025-07-30 NOTE — PROGRESS NOTES
[facility] Note    Subjective     Chief Complaint   Patient presents with    Right Knee - Pain    Left Knee - Pain        HPI    Darryl Mendoza is a 74 y.o. male who presents with new problem of: bilateral knee pain.  Onset: atraumatic and gradual in nature. The issue has been ongoing for 8 years on the right knee and 3 days on the left knee. Pain is a 4/10 on the pain scale. Pain is described as dull, aching, burning, throbbing, stabbing, and shooting. Associated symptoms include pain, stiffness, and giving way/buckling. The pain is worse with sitting, climbing stairs, sleeping, leisure, and lying on affected side; resting, sitting, pain medication and/or NSAID, and lying down improve the pain. Previous treatments have included: NSAIDS and viscosupplementation (last injection 3/16/2022 on the right knee).  Good relief with viscosupplementation injections in the past in his right knee.  Left knee has been bothering him also.  He is interested in injections if possible.    I have reviewed the following portions of the patient's history and agree with: History of Present Illness and Review of Systems    Patient Active Problem List   Diagnosis    Sprain of medial collateral ligament of right knee    Primary osteoarthritis of right knee    Primary hypertension    Mixed hyperlipidemia    Bilateral carotid artery stenosis     Past Medical History:   Diagnosis Date    Arthritis     knee    Arthritis of neck     CTS (carpal tunnel syndrome)     Dupuytren's contracture     GERD (gastroesophageal reflux disease)     Heart murmur     Hyperlipidemia     Hypertension     Knee sprain     Knee swelling     Lumbosacral disc disease     Neck strain     Pain, ankle     Smoker     Tear of meniscus of knee     Tendinitis of knee       Past Surgical History:   Procedure Laterality Date    ABDOMINAL SURGERY  1985    repair surgery lysis of adhesions s/p colon surgery as infant    CERVICAL DISC SURGERY       CERVICAL FUSION      COLON SURGERY      as infant; born with sbo    KNEE MENISCAL REPAIR      KNEE SURGERY  2018    Meniscus repair right knee    LYMPH NODE DISSECTION      at [facility], removed completely, whole, negative malignancy, follow up scans with [facility] sale to [facility] oncology.    NECK SURGERY      TONSILLECTOMY        Family History   Problem Relation Age of Onset    Stroke Mother     Hypertension Mother     Osteoarthritis Mother     Hyperlipidemia Mother     Osteoporosis Mother     Cancer Father     Stroke Other     Heart disease Other     Hypertension Other     Heart attack Other     Osteoarthritis Other      Social History     Socioeconomic History    Marital status:    Tobacco Use    Smoking status: Some Days     Current packs/day: 0.25     Average packs/day: 0.3 packs/day for 30.8 years (8.5 ttl pk-yrs)     Types: Cigarettes     Start date: 1/1/2000    Smokeless tobacco: Never   Vaping Use    Vaping status: Never Used    Passive vaping exposure: Yes   Substance and Sexual Activity    Alcohol use: Never    Drug use: Never    Sexual activity: Not Currently     Partners: Female      Current Outpatient Medications on File Prior to Visit   Medication Sig Dispense Refill    Acetaminophen 500 MG capsule Take  by mouth.      amLODIPine-Valsartan-HCTZ -25 MG tablet TAKE 1 TABLET DAILY (DOSE  ADJUSTMENT) 90 tablet 1    atorvastatin (LIPITOR) 20 MG tablet atorvastatin 20 mg tablet   Take 1 tablet every day by oral route for 90 days.      cyanocobalamin 1000 MCG/ML injection 1 mL Every 30 (Thirty) Days.      famotidine (PEPCID) 20 MG tablet 0.5 tablets 2 (Two) Times a Day As Needed.      finasteride (PROSCAR) 5 MG tablet As Needed. Pt not taking daily      hydrocortisone (CORTEF) 10 MG tablet       ibuprofen (ADVIL,MOTRIN) 200 MG tablet Take 1 tablet by mouth Every 6 (Six) Hours As Needed for Mild Pain.      tamsulosin (FLOMAX) 0.4 MG capsule 24 hr capsule Take 1 capsule by mouth As Needed. Pt not taking daily       carboxymethylcellulose (REFRESH PLUS) 0.5 % solution 3 (Three) Times a Day As Needed for Dry Eyes. (Patient not taking: Reported on 9/18/2024)      cyclobenzaprine (FEXMID) 7.5 MG tablet Take 1 tablet by mouth 3 (Three) Times a Day.      HYDROcodone-acetaminophen (NORCO) 5-325 MG per tablet Take 1 tablet by mouth 4 (Four) Times a Day.       No current facility-administered medications on file prior to visit.      No Known Allergies     Review of Systems   Constitutional:  Negative for activity change, appetite change, chills, diaphoresis, fatigue, fever and unexpected weight change.   HENT:  Negative for congestion, dental problem, drooling, ear discharge, ear pain, facial swelling, hearing loss, mouth sores, nosebleeds, postnasal drip, rhinorrhea, sinus pressure, sneezing, sore throat, tinnitus, trouble swallowing and voice change.    Eyes:  Negative for photophobia, pain, discharge, redness, itching and visual disturbance.   Respiratory:  Negative for apnea, cough, choking, chest tightness, shortness of breath, wheezing and stridor.    Cardiovascular:  Negative for chest pain, palpitations and leg swelling.   Gastrointestinal:  Negative for abdominal distention, abdominal pain, anal bleeding, blood in stool, constipation, diarrhea, nausea, rectal pain and vomiting.   Endocrine: Negative for cold intolerance, heat intolerance, polydipsia, polyphagia and polyuria.   Genitourinary:  Negative for decreased urine volume, difficulty urinating, dysuria, enuresis, flank pain, frequency, genital sores, hematuria and urgency.   Musculoskeletal:  Positive for arthralgias. Negative for back pain, gait problem, joint swelling, myalgias, neck pain and neck stiffness.   Skin:  Negative for color change, pallor, rash and wound.   Allergic/Immunologic: Negative for environmental allergies, food allergies and immunocompromised state.   Neurological:  Negative for dizziness, tremors, seizures, syncope, facial asymmetry, speech  "difficulty, weakness, light-headedness, numbness and headaches.   Hematological:  Negative for adenopathy. Does not bruise/bleed easily.   Psychiatric/Behavioral:  Negative for agitation, behavioral problems, confusion, decreased concentration, dysphoric mood, hallucinations, self-injury, sleep disturbance and suicidal ideas. The patient is not nervous/anxious and is not hyperactive.         Objective      Physical Exam  /66   Ht 182.9 cm (72\")   Wt 91.2 kg (201 lb)   BMI 27.26 kg/m²     Body mass index is 27.26 kg/m².  BMI is >= 25 and <30. (Overweight) The following options were offered after discussion;: referral to primary care        General:   Mental Status:  Alert   Appearance: Cooperative, in no acute distress   Build and Nutrition: Well-nourished well-developed male   Orientation: Alert and oriented to person, place and time   Posture: Normal   Gait: Nonantalgic    Integument:   Right knee: no skin lesions, no rash, no ecchymosis   Left knee: no skin lesions, no rash, no ecchymosis    Lower Extremities:   Right Knee:    Tenderness:  Medial    Effusion:  None    Swelling:  None    Crepitus:  Positive    Atrophy:  None    Range of motion:  Extension: 0°       Flexion: 130°  Instability:  No varus laxity, no valgus laxity, negative anterior drawer  Deformities:  None   Left Knee:    Tenderness:  Lateral tenderness    Effusion:  None    Swelling:  None    Crepitus: Positive    Atrophy:  None    Range of motion:  Extension: 0°       Flexion: 130°  Instability:  No varus laxity, no valgus laxity, negative anterior drawer  Deformities:  None      Imaging/Studies      Imaging Results (Last 24 Hours)       Procedure Component Value Units Date/Time    XR Knee 4+ View Bilateral [[id number]] Resulted: 07/30/25 0855     Updated: 07/30/25 0855    Narrative:      Right Knee Radiographs  Indication: right knee pain  Views: Standing AP's and skiers of both knees, with lateral and sunrise   views of the right " knee    Comparison: 3/2/2022    Findings:    Medial joint space narrowing, tricompartmental degenerative changes, no   acute bony abnormalities.  Knee arthritis.  Stable compared to the   previous imaging.  Vascular calcifications noted.    Left Knee Radiographs  Indication: left knee pain  Views: Standing AP's and skiers of both knees, with lateral and sunrise   views of the left knee    Comparison: no prior studies available    Findings:    Medial joint space narrowing, tricompartmental degenerative changes, no   acute bony abnormalities.  Knee arthritis.  Vascular calcifications noted.              Assessment and Plan     Diagnoses and all orders for this visit:    1. Primary osteoarthritis of both knees (Primary)  -     XR Knee 4+ View Bilateral  -     Large Joint Arthrocentesis  -     - Large Joint Arthrocentesis: bilateral knee        1. Primary osteoarthritis of both knees          Reviewed my findings with the patient.  He has bilateral knee arthritis, and responded well to viscosupplementation injections in the past in his right knee.  He would like to proceed with viscosupplementation injections for his knees, series was started today.    Procedure Note:  The potential benefits of performing a therapeutic knee joint visco supplementation injection, as well as potential risks (including, but not limited to infection, swelling, pain, bleeding, bruising, nerve/blood vessel damage, and pseudoseptic reaction) have been discussed with the patient.  After informed consent, timeout procedure was performed, and the skin on the right and left knee was prepped with chlorhexidine soap and alcohol, after which ethyl chloride was applied to the skin at the injection site. Via the anterolateral approach, Orthovisc was injected into the knee joint.  The patient tolerated the procedure well. There were no complications.  Band-Aid was applied to the injection site. Post-procedural instructions discussed with the patient  and/or their caregiver.      Return in about 1 week (around 8/6/2025) for injection.      Prashanth Avelar MD  07/30/25  09:30 EDT      Dictated Utilizing Dragon Dictation

## 2025-07-30 NOTE — PROGRESS NOTES
Procedure   - Large Joint Arthrocentesis: bilateral knee on 7/30/2025 9:12 AM  Indications: pain  Details: 21 G needle, anterolateral approach  Medications (Right): 30 mg Hyaluronan 30 MG/2ML  Medications (Left): 30 mg Hyaluronan 30 MG/2ML  Outcome: tolerated well, no immediate complications  Procedure, treatment alternatives, risks and benefits explained, specific risks discussed. Consent was given by the patient. Immediately prior to procedure a time out was called to verify the correct patient, procedure, equipment, support staff and site/side marked as required. Patient was prepped and draped in the usual sterile fashion.

## 2025-08-06 ENCOUNTER — CLINICAL SUPPORT (OUTPATIENT)
Dept: ORTHOPEDIC SURGERY | Facility: CLINIC | Age: 75
End: 2025-08-06
Payer: MEDICARE

## 2025-08-06 DIAGNOSIS — M17.0 PRIMARY OSTEOARTHRITIS OF BOTH KNEES: Primary | ICD-10-CM

## 2025-08-13 ENCOUNTER — CLINICAL SUPPORT (OUTPATIENT)
Dept: ORTHOPEDIC SURGERY | Facility: CLINIC | Age: 75
End: 2025-08-13
Payer: MEDICARE

## 2025-08-13 DIAGNOSIS — M17.0 PRIMARY OSTEOARTHRITIS OF BOTH KNEES: Primary | ICD-10-CM
